# Patient Record
Sex: FEMALE | Race: WHITE | Employment: UNEMPLOYED | URBAN - METROPOLITAN AREA
[De-identification: names, ages, dates, MRNs, and addresses within clinical notes are randomized per-mention and may not be internally consistent; named-entity substitution may affect disease eponyms.]

---

## 2020-09-11 ENCOUNTER — TELEPHONE (OUTPATIENT)
Dept: PAIN MANAGEMENT | Age: 55
End: 2020-09-11

## 2020-09-11 NOTE — TELEPHONE ENCOUNTER
Patient called to see if a referral for her was received in the office via fax. Please contact her at earliest convenience to advise. She'd like to schedule an appointment to be seen. Thank you.

## 2020-10-22 ENCOUNTER — HOSPITAL ENCOUNTER (OUTPATIENT)
Age: 55
Setting detail: SPECIMEN
Discharge: HOME | End: 2020-10-22
Payer: COMMERCIAL

## 2020-10-22 ENCOUNTER — INITIAL CONSULT (OUTPATIENT)
Dept: PAIN MANAGEMENT | Age: 55
End: 2020-10-22
Payer: COMMERCIAL

## 2020-10-22 VITALS
HEIGHT: 64 IN | HEART RATE: 61 BPM | WEIGHT: 140 LBS | SYSTOLIC BLOOD PRESSURE: 122 MMHG | TEMPERATURE: 98.1 F | BODY MASS INDEX: 23.9 KG/M2 | DIASTOLIC BLOOD PRESSURE: 70 MMHG

## 2020-10-22 PROCEDURE — 99203 OFFICE O/P NEW LOW 30 MIN: CPT | Performed by: PAIN MEDICINE

## 2020-10-22 RX ORDER — LISINOPRIL AND HYDROCHLOROTHIAZIDE 20; 12.5 MG/1; MG/1
1 TABLET ORAL
COMMUNITY
End: 2022-06-09

## 2020-10-22 RX ORDER — HYDROXYCHLOROQUINE SULFATE 200 MG/1
TABLET, FILM COATED ORAL
COMMUNITY
Start: 2020-10-12 | End: 2022-04-07 | Stop reason: ALTCHOICE

## 2020-10-22 RX ORDER — DICYCLOMINE HYDROCHLORIDE 10 MG/1
10 CAPSULE ORAL
COMMUNITY
End: 2022-04-07

## 2020-10-22 RX ORDER — METOPROLOL SUCCINATE 25 MG/1
25 TABLET, EXTENDED RELEASE ORAL
COMMUNITY
Start: 2020-03-18

## 2020-10-22 RX ORDER — PRAVASTATIN SODIUM 40 MG
40 TABLET ORAL
COMMUNITY
End: 2022-06-09

## 2020-10-22 RX ORDER — CIPROFLOXACIN AND DEXAMETHASONE 3; 1 MG/ML; MG/ML
SUSPENSION/ DROPS AURICULAR (OTIC) 3 TIMES DAILY PRN
COMMUNITY
Start: 2020-05-15 | End: 2022-04-07 | Stop reason: ALTCHOICE

## 2020-10-22 RX ORDER — PANTOPRAZOLE SODIUM 40 MG/1
TABLET, DELAYED RELEASE ORAL
COMMUNITY
Start: 2019-11-15

## 2020-10-22 RX ORDER — CITALOPRAM 40 MG/1
40 TABLET ORAL
COMMUNITY

## 2020-10-22 RX ORDER — MINERAL OIL/PETROLATUM,WHITE 41.5-56.8%
OINTMENT (GRAM) OPHTHALMIC (EYE)
COMMUNITY
Start: 2020-09-29

## 2020-10-22 RX ORDER — ALBUTEROL SULFATE 90 MCG
HFA AEROSOL WITH ADAPTER (GRAM) INHALATION
COMMUNITY
Start: 2020-10-20

## 2020-10-22 RX ORDER — MELOXICAM 15 MG/1
TABLET ORAL
COMMUNITY
Start: 2019-11-26 | End: 2020-10-22 | Stop reason: ALTCHOICE

## 2020-10-22 RX ORDER — PROMETHAZINE HYDROCHLORIDE 25 MG/1
25 TABLET ORAL
COMMUNITY
Start: 2020-04-03 | End: 2022-06-09

## 2020-10-22 RX ORDER — PREGABALIN 100 MG/1
100 CAPSULE ORAL 2 TIMES DAILY
COMMUNITY
Start: 2020-08-20 | End: 2020-11-23 | Stop reason: ALTCHOICE

## 2020-10-22 RX ORDER — LEFLUNOMIDE 20 MG/1
TABLET ORAL
COMMUNITY
Start: 2020-10-10 | End: 2022-04-07 | Stop reason: ALTCHOICE

## 2020-10-22 RX ORDER — ROPINIROLE 2 MG/1
TABLET, FILM COATED ORAL
COMMUNITY
Start: 2020-03-18

## 2020-10-22 RX ORDER — QUETIAPINE FUMARATE 200 MG/1
400 TABLET, FILM COATED ORAL NIGHTLY
COMMUNITY

## 2020-10-22 RX ORDER — TIZANIDINE 4 MG/1
TABLET ORAL
COMMUNITY
Start: 2020-10-12 | End: 2022-06-09

## 2020-10-22 RX ORDER — FLUTICASONE PROPIONATE 50 MCG
2 SPRAY, SUSPENSION (ML) NASAL DAILY
COMMUNITY
Start: 2019-11-20

## 2020-10-22 RX ORDER — PREDNISONE 1 MG/1
TABLET ORAL
COMMUNITY
Start: 2020-08-04 | End: 2022-06-09

## 2020-10-22 RX ORDER — ARIPIPRAZOLE 10 MG/1
10 TABLET ORAL
COMMUNITY
End: 2022-06-09

## 2020-10-22 RX ORDER — TIZANIDINE 4 MG/1
4 TABLET ORAL 3 TIMES DAILY
COMMUNITY
End: 2020-10-22

## 2020-10-22 RX ORDER — OXYCODONE AND ACETAMINOPHEN 10; 325 MG/1; MG/1
1 TABLET ORAL
COMMUNITY

## 2020-10-22 RX ORDER — FENOFIBRATE 160 MG/1
160 TABLET ORAL
COMMUNITY
End: 2022-06-09

## 2020-10-22 RX ORDER — ONDANSETRON 8 MG/1
TABLET, ORALLY DISINTEGRATING ORAL
COMMUNITY
Start: 2019-12-02

## 2020-10-22 RX ORDER — ISOSORBIDE MONONITRATE 30 MG/1
30 TABLET, EXTENDED RELEASE ORAL NIGHTLY
COMMUNITY
Start: 2019-12-12

## 2020-10-22 RX ORDER — POTASSIUM CHLORIDE 20 MEQ/1
1 TABLET, EXTENDED RELEASE ORAL
COMMUNITY
Start: 2019-12-04

## 2020-10-22 RX ORDER — PRAZOSIN HYDROCHLORIDE 2 MG/1
2 CAPSULE ORAL NIGHTLY
COMMUNITY
Start: 2019-11-15 | End: 2022-06-09

## 2020-10-22 RX ORDER — DICLOFENAC SODIUM 75 MG/1
75 TABLET, DELAYED RELEASE ORAL
COMMUNITY
Start: 2020-05-26 | End: 2022-04-07 | Stop reason: ALTCHOICE

## 2020-10-22 RX ORDER — OXYBUTYNIN CHLORIDE 10 MG/1
10 TABLET, EXTENDED RELEASE ORAL
COMMUNITY

## 2020-10-22 RX ORDER — DIAZEPAM 10 MG/1
10 TABLET ORAL 3 TIMES DAILY PRN
COMMUNITY
End: 2022-04-07 | Stop reason: ALTCHOICE

## 2020-10-22 RX ORDER — MULTIVIT-MIN/IRON/FOLIC ACID/K 18-600-40
CAPSULE ORAL
COMMUNITY
Start: 2020-10-08

## 2020-10-22 ASSESSMENT — ENCOUNTER SYMPTOMS
BOWEL INCONTINENCE: 0
BACK PAIN: 1
CONSTIPATION: 1
CHANGE IN BOWEL HABIT: 0
RESPIRATORY NEGATIVE: 1
NAUSEA: 1
DIARRHEA: 1
FLATUS: 1
EYES NEGATIVE: 1
ABDOMINAL PAIN: 1
PERSISTENT INFECTIONS: 0

## 2020-10-22 NOTE — PROGRESS NOTES
HPI:     Hand Pain    The incident occurred more than 1 week ago. There was no injury mechanism. The pain is present in the right hand and left hand. The quality of the pain is described as aching and cramping. The pain radiates to the right hand and left hand. The pain is at a severity of 8/10. The pain is moderate. The pain has been constant since the incident. Associated symptoms include muscle weakness, numbness and tingling. Pertinent negatives include no chest pain. The symptoms are aggravated by movement. She has tried acetaminophen, elevation and rest for the symptoms. The treatment provided mild relief. Leg Pain    The incident occurred more than 1 week ago. There was no injury mechanism. The pain is present in the left leg, left hip, left knee, left ankle, left heel, left foot, left toes, right leg, right knee, right thigh, right hip, right ankle, right heel, right foot and right toes. The quality of the pain is described as aching and cramping. The pain is at a severity of 8/10. The pain is moderate. The pain has been constant since onset. Associated symptoms include muscle weakness, numbness and tingling. The symptoms are aggravated by movement, palpation and weight bearing. She has tried elevation, acetaminophen, heat and rest for the symptoms. The treatment provided mild relief. Chronic diffuse pain throughout in her neck back arms legs hands and feet. She does have a history of lupus. Also history of fibromyalgia. Takes Lyrica 100 mg twice a day. History of alcohol abuse has been sober for 3 years now. Does use medical marijuana in the state of [place]. Patient denies any new neurological symptoms. No bowel or bladder incontinence, no weakness, and no falling. Review of OARRS does not show any aberrant prescription behavior. Medication is helping the patient stay active. Patient denies any side effects and reports adequate analgesia. No sign of misuse/abuse.     Past Medical History: Diagnosis Date    Anxiety     Fibromyalgia     Gastric ulcer     Lupus ([facility] [address]     Reflux gastritis     Restless leg syndrome     Rheumatoid arthritis (HCC)        Past Surgical History:   Procedure Laterality Date    LARYNX SURGERY      OVARIAN CYST REMOVAL         Allergies   Allergen Reactions    Macrolides And Ketolides      Other reaction(s): Abdominal Pain    Erythromycin Nausea Only    Ketorolac Nausea Only    Nsaids      Gastric ulcers    Pcn [Penicillins] Hives    Sulfa Antibiotics Hives and Itching    Sulfamethoxazole-Trimethoprim     Ciprofloxacin Nausea And Vomiting     Pain, lethargic         Current Outpatient Medications:     pregabalin (LYRICA) 100 MG capsule, Take 100 mg by mouth 2 times daily. , Disp: , Rfl:     diclofenac (VOLTAREN) 75 MG EC tablet, Take 75 mg by mouth, Disp: , Rfl:     isosorbide mononitrate (IMDUR) 30 MG extended release tablet, Take 30 mg by mouth nightly, Disp: , Rfl:     leflunomide (ARAVA) 20 MG tablet, , Disp: , Rfl:     lisinopril-hydroCHLOROthiazide (PRINZIDE;ZESTORETIC) 20-12.5 MG per tablet, Take 1 tablet by mouth, Disp: , Rfl:     metoprolol succinate (TOPROL XL) 25 MG extended release tablet, Take 25 mg by mouth daily (with breakfast), Disp: , Rfl:     ondansetron (ZOFRAN-ODT) 8 MG TBDP disintegrating tablet, , Disp: , Rfl:     pantoprazole (PROTONIX) 40 MG tablet, , Disp: , Rfl:     potassium chloride (KLOR-CON M) 20 MEQ extended release tablet, Take 1 tablet by mouth daily (with breakfast), Disp: , Rfl:     pravastatin (PRAVACHOL) 40 MG tablet, Take 40 mg by mouth, Disp: , Rfl:     prazosin (MINIPRESS) 2 MG capsule, Take 2 mg by mouth nightly, Disp: , Rfl:     predniSONE (DELTASONE) 5 MG tablet, , Disp: , Rfl:     promethazine (PHENERGAN) 25 MG tablet, Take 25 mg by mouth daily (with breakfast), Disp: , Rfl:     QUEtiapine (SEROQUEL) 200 MG tablet, Take 400 mg by mouth nightly, Disp: , Rfl:     rOPINIRole (REQUIP) 2 MG tablet, , Disp: , Rfl:     tiZANidine (ZANAFLEX) 4 MG tablet, , Disp: , Rfl:     verapamil (CALAN SR) 180 MG extended release tablet, Take 180 mg by mouth nightly, Disp: , Rfl:     vitamin E 100 units capsule, Take 100 Units by mouth, Disp: , Rfl:     SENNA-S 8.6-50 MG per tablet, , Disp: , Rfl:     fluticasone (FLONASE) 50 MCG/ACT nasal spray, 2 sprays by Nasal route daily, Disp: , Rfl:     dicyclomine (BENTYL) 10 MG capsule, Take 10 mg by mouth, Disp: , Rfl:     fenofibrate (TRIGLIDE) 160 MG tablet, Take 160 mg by mouth, Disp: , Rfl:     diazePAM (VALIUM) 10 MG tablet, Take 10 mg by mouth 3 times daily as needed. , Disp: , Rfl:     citalopram (CELEXA) 40 MG tablet, Take 40 mg by mouth, Disp: , Rfl:     ciprofloxacin-dexamethasone (CIPRODEX) 0.3-0.1 % otic suspension, 3 times daily as needed, Disp: , Rfl:     D3-1000 25 MCG (1000 UT) TABS tablet, , Disp: , Rfl:     beclomethasone (QVAR REDIHALER) 80 MCG/ACT AERB inhaler, Inhale 160 mcg into the lungs 2 times daily, Disp: , Rfl:     PROVENTIL  (90 Base) MCG/ACT inhaler, , Disp: , Rfl:     esomeprazole (NEXIUM) 20 MG delayed release capsule, Take 20 mg by mouth daily (with breakfast), Disp: , Rfl:     Cyanocobalamin (VITAMIN B 12 PO), Take 50 mcg by mouth, Disp: , Rfl:     omeprazole (PRILOSEC) 40 MG capsule, , Disp: , Rfl:     cyclobenzaprine (FLEXERIL) 10 MG tablet, , Disp: , Rfl:     furosemide (LASIX) 40 MG tablet, , Disp: , Rfl:     oxybutynin (DITROPAN-XL) 10 MG extended release tablet, Take 10 mg by mouth, Disp: , Rfl:     oxyCODONE-acetaminophen (PERCOCET)  MG per tablet, Take 1 tablet by mouth every 4-6 hours as needed. , Disp: , Rfl:     hydroxychloroquine (PLAQUENIL) 200 MG tablet, , Disp: , Rfl:     ARIPiprazole (ABILIFY) 10 MG tablet, Take 10 mg by mouth, Disp: , Rfl:     traZODone (DESYREL) 100 MG tablet, , Disp: , Rfl:     potassium chloride (K-DUR) 10 MEQ tablet, , Disp: , Rfl:     atorvastatin (LIPITOR) 20 MG tablet, , Disp: , Rfl:     gabapentin (NEURONTIN) 300 MG capsule, , Disp: , Rfl:     Family History   Problem Relation Age of Onset    Liver Cancer Maternal Grandfather        Social History     Socioeconomic History    Marital status:      Spouse name: Not on file    Number of children: Not on file    Years of education: Not on file    Highest education level: Not on file   Occupational History    Not on file   Social Needs    Financial resource strain: Not on file    Food insecurity     Worry: Not on file     Inability: Not on file    Transportation needs     Medical: Not on file     Non-medical: Not on file   Tobacco Use    Smoking status: Light Tobacco Smoker    Smokeless tobacco: Never Used   Substance and Sexual Activity    Alcohol use: Not Currently    Drug use: Yes     Frequency: 2.0 times per week     Types: Marijuana     Comment: medical marijuana  twice a week as of 10/22/20    Sexual activity: Not on file   Lifestyle    Physical activity     Days per week: Not on file     Minutes per session: Not on file    Stress: Not on file   Relationships    Social connections     Talks on phone: Not on file     Gets together: Not on file     Attends Catholic service: Not on file     Active member of club or organization: Not on file     Attends meetings of clubs or organizations: Not on file     Relationship status: Not on file    Intimate partner violence     Fear of current or ex partner: Not on file     Emotionally abused: Not on file     Physically abused: Not on file     Forced sexual activity: Not on file   Other Topics Concern    Not on file   Social History Narrative    Not on file       Review of Systems:  Review of Systems   Constitution: Positive for malaise/fatigue. HENT: Positive for hearing loss. Eyes: Negative. Cardiovascular: Negative. Negative for chest pain. Respiratory: Negative. Skin: Negative.     Musculoskeletal: Positive for arthritis, back pain, joint pain, joint swelling, muscle cramps, muscle weakness, myalgias, neck pain and stiffness. Gastrointestinal: Positive for abdominal pain, constipation, diarrhea, dysphagia, flatus and nausea. Negative for change in bowel habit and bowel incontinence. Genitourinary: Negative for bladder incontinence. Neurological: Positive for headaches, numbness, tingling and tremors. Psychiatric/Behavioral: The patient is nervous/anxious. Allergic/Immunologic: Positive for environmental allergies. Negative for persistent infections. Physical Exam:  /70   Pulse 61   Temp 98.1 °F (36.7 °C)   Ht 5' 4\" (1.626 m)   Wt 140 lb (63.5 kg)   BMI 24.03 kg/m²     Physical Exam  Vitals signs reviewed. Constitutional:       General: She is awake. Appearance: She is not ill-appearing or diaphoretic. HENT:      Right Ear: External ear normal.      Left Ear: External ear normal.   Eyes:      General:         Right eye: No discharge. Left eye: No discharge. Conjunctiva/sclera: Conjunctivae normal.   Neck:      Thyroid: No thyromegaly. Trachea: No tracheal deviation. Pulmonary:      Effort: Pulmonary effort is normal. No respiratory distress. Breath sounds: No stridor. Musculoskeletal:      Lumbar back: She exhibits normal range of motion. Comments: Diffuse tenderness to palpation   Skin:     General: Skin is warm and dry. Neurological:      Mental Status: She is alert and oriented to person, place, and time. Psychiatric:         Attention and Perception: Attention and perception normal.         Behavior: Behavior normal.         Record/Diagnostics Review:    As above, I did review the imaging    Orders:  Orders Placed This Encounter   Procedures    DRUG SCREEN, PAIN       Assessment:  1. Fibromyalgia    2. Other chronic pain    3. Marijuana use        Treatment Plan:  DISCUSSION: Treatment options discussed with patient and all questions answered to patient's satisfaction.     OARRS Review:

## 2020-10-26 LAB
6-ACETYLMORPHINE, UR: NOT DETECTED
7-AMINOCLONAZEPAM, URINE: NOT DETECTED
ALPHA-OH-ALPRAZ, URINE: NOT DETECTED
ALPRAZOLAM, URINE: NOT DETECTED
AMPHETAMINES, URINE: NOT DETECTED
BARBITURATES, URINE: NOT DETECTED
BENZOYLECGONINE, UR: NOT DETECTED
BUPRENORPHINE URINE: NOT DETECTED
CARISOPRODOL, UR: NOT DETECTED
CLONAZEPAM, URINE: NOT DETECTED
CODEINE, URINE: NOT DETECTED
CREATININE URINE: 24.8 MG/DL (ref 20–400)
DIAZEPAM, URINE: NOT DETECTED
DRUGS EXPECTED, UR: NORMAL
EER HI RES INTERP UR: NORMAL
ETHYL GLUCURONIDE UR: NOT DETECTED
FENTANYL URINE: NOT DETECTED
HYDROCODONE, URINE: NOT DETECTED
HYDROMORPHONE, URINE: NOT DETECTED
LORAZEPAM, URINE: NOT DETECTED
MARIJUANA METAB, UR: PRESENT
MDA, UR: NOT DETECTED
MDEA, EVE, UR: NOT DETECTED
MDMA URINE: NOT DETECTED
MEPERIDINE METAB, UR: NOT DETECTED
METHADONE, URINE: NOT DETECTED
METHAMPHETAMINE, URINE: NOT DETECTED
METHYLPHENIDATE: NOT DETECTED
MIDAZOLAM, URINE: NOT DETECTED
MORPHINE URINE: NOT DETECTED
NORBUPRENORPHINE, URINE: NOT DETECTED
NORDIAZEPAM, URINE: PRESENT
NORFENTANYL, URINE: NOT DETECTED
NORHYDROCODONE, URINE: NOT DETECTED
NOROXYCODONE, URINE: NOT DETECTED
NOROXYMORPHONE, URINE: NOT DETECTED
OXAZEPAM, URINE: PRESENT
OXYCODONE URINE: NOT DETECTED
OXYMORPHONE, URINE: NOT DETECTED
PAIN MANAGEMENT DRUG PANEL INTERP, URINE: NORMAL
PAIN MGT DRUG PANEL, HI RES, UR: NORMAL
PCP,URINE: NOT DETECTED
PHENTERMINE, UR: NOT DETECTED
PROPOXYPHENE, URINE: NOT DETECTED
TAPENTADOL, URINE: NOT DETECTED
TAPENTADOL-O-SULFATE, URINE: NOT DETECTED
TEMAZEPAM, URINE: PRESENT
TRAMADOL, URINE: NOT DETECTED
ZOLPIDEM, URINE: NOT DETECTED

## 2020-11-06 ENCOUNTER — TELEPHONE (OUTPATIENT)
Dept: PAIN MANAGEMENT | Age: 55
End: 2020-11-06

## 2020-11-06 NOTE — TELEPHONE ENCOUNTER
Patient called regarding her Lyrica prescription. She states Dr Aaliyah Amador was increasing her medication from 200 to 300 per day and the pharmacy does not have a refill for her. She states she is now out. She states she was to receive a return phone call yesterday. Please contact her as soon as possible to discuss. Phone number on file has been verified. Thank you.

## 2020-11-10 ENCOUNTER — TELEPHONE (OUTPATIENT)
Dept: PAIN MANAGEMENT | Age: 55
End: 2020-11-10

## 2020-11-10 NOTE — TELEPHONE ENCOUNTER
Patient states that Dr. Abundio Hernández increased her Lyrica to 100mg TID, and she has now run out.  She is requesting a refill to be sent to her pharmacy asap

## 2020-11-23 ENCOUNTER — VIRTUAL VISIT (OUTPATIENT)
Dept: PAIN MANAGEMENT | Age: 55
End: 2020-11-23
Payer: COMMERCIAL

## 2020-11-23 PROBLEM — G89.29 OTHER CHRONIC PAIN: Status: ACTIVE | Noted: 2020-11-23

## 2020-11-23 PROBLEM — M79.7 FIBROMYALGIA: Status: ACTIVE | Noted: 2020-11-23

## 2020-11-23 PROCEDURE — 99214 OFFICE O/P EST MOD 30 MIN: CPT | Performed by: NURSE PRACTITIONER

## 2020-11-23 RX ORDER — PREGABALIN 100 MG/1
100 CAPSULE ORAL 3 TIMES DAILY
Qty: 90 CAPSULE | Refills: 0 | Status: SHIPPED | OUTPATIENT
Start: 2020-11-30 | End: 2021-01-11 | Stop reason: SDUPTHER

## 2020-11-23 ASSESSMENT — ENCOUNTER SYMPTOMS
COUGH: 0
SHORTNESS OF BREATH: 0
CONSTIPATION: 0
BACK PAIN: 1

## 2020-11-23 NOTE — PROGRESS NOTES
Patient completed a video visit today    Chief Complaint: multiple pain complaints    [facility] Patient complains of diffuse pain throughout her body. She does have a history of Lupus and Fibromyalgia. She has been taking Lyrica 100 mg BID with mild relief. At recent visit with Dr. Indira Hu, taking 100 mg TID was discussed. Patient started to take Lyrica 100 mg TID after recent visit and reports mild relief. Patient has a history of ETOH abuse. She uses medical marijuana for pain relief. Pt did get a prescription for #60 Lyrica on 11/10 from a provider in [place]. Did discuss with patient that she should only be taking Lyrica prescribed by pain management at this time. She will be due to fill 11/30 to continue TID dose. Hand Pain    The incident occurred more than 1 week ago. There was no injury mechanism. The pain is present in the left hand and right hand. Quality: tingling, numbness. The pain is at a severity of 6/10. The pain is moderate. The pain has been constant since the incident. Associated symptoms include numbness and tingling. Pertinent negatives include no chest pain. She has tried rest for the symptoms. The treatment provided mild relief. Neck Pain    This is a chronic problem. The current episode started more than 1 year ago. The problem occurs constantly. The problem has been unchanged. The pain is present in the left side, midline and right side. The quality of the pain is described as aching. The pain is at a severity of 9/10. The pain is moderate. The symptoms are aggravated by position and twisting. Associated symptoms include headaches, numbness and tingling. Pertinent negatives include no chest pain or fever. She has tried bed rest and heat for the symptoms. The treatment provided mild relief. Back Pain   This is a chronic problem. The current episode started more than 1 year ago. The problem occurs constantly. The problem is unchanged. The pain is present in the lumbar spine.  The quality of the pain is described as aching. Radiates to: down both legs to the feet. The pain is at a severity of 7/10. The pain is moderate. Associated symptoms include headaches, numbness and tingling. Pertinent negatives include no chest pain or fever. She has tried analgesics and heat for the symptoms. The treatment provided mild relief. Patient denies any new neurological symptoms. No bowel or bladder incontinence, no weakness, and no falling. Controlled Substance Monitoring:    Acute and Chronic Pain Monitoring:   RX Monitoring 11/23/2020   Periodic Controlled Substance Monitoring No signs of potential drug abuse or diversion identified. Past Medical History:   Diagnosis Date    Anxiety     Fibromyalgia     Gastric ulcer     Lupus ([facility] [address]     Reflux gastritis     Restless leg syndrome     Rheumatoid arthritis (HCC)        Past Surgical History:   Procedure Laterality Date    LARYNX SURGERY      OVARIAN CYST REMOVAL         Allergies   Allergen Reactions    Macrolides And Ketolides      Other reaction(s): Abdominal Pain    Erythromycin Nausea Only    Ketorolac Nausea Only    Nsaids      Gastric ulcers    Pcn [Penicillins] Hives    Sulfa Antibiotics Hives and Itching    Sulfamethoxazole-Trimethoprim     Ciprofloxacin Nausea And Vomiting     Pain, lethargic         Current Outpatient Medications:     pregabalin (LYRICA) 100 MG capsule, Take 100 mg by mouth 2 times daily. , Disp: , Rfl:     diclofenac (VOLTAREN) 75 MG EC tablet, Take 75 mg by mouth, Disp: , Rfl:     isosorbide mononitrate (IMDUR) 30 MG extended release tablet, Take 30 mg by mouth nightly, Disp: , Rfl:     leflunomide (ARAVA) 20 MG tablet, , Disp: , Rfl:     lisinopril-hydroCHLOROthiazide (PRINZIDE;ZESTORETIC) 20-12.5 MG per tablet, Take 1 tablet by mouth, Disp: , Rfl:     metoprolol succinate (TOPROL XL) 25 MG extended release tablet, Take 25 mg by mouth daily (with breakfast), Disp: , Rfl:     ondansetron (ZOFRAN-ODT) 8 MG TBDP disintegrating tablet, , Disp: , Rfl:     oxybutynin (DITROPAN-XL) 10 MG extended release tablet, Take 10 mg by mouth, Disp: , Rfl:     oxyCODONE-acetaminophen (PERCOCET)  MG per tablet, Take 1 tablet by mouth every 4-6 hours as needed. , Disp: , Rfl:     pantoprazole (PROTONIX) 40 MG tablet, , Disp: , Rfl:     potassium chloride (KLOR-CON M) 20 MEQ extended release tablet, Take 1 tablet by mouth daily (with breakfast), Disp: , Rfl:     pravastatin (PRAVACHOL) 40 MG tablet, Take 40 mg by mouth, Disp: , Rfl:     prazosin (MINIPRESS) 2 MG capsule, Take 2 mg by mouth nightly, Disp: , Rfl:     predniSONE (DELTASONE) 5 MG tablet, , Disp: , Rfl:     promethazine (PHENERGAN) 25 MG tablet, Take 25 mg by mouth daily (with breakfast), Disp: , Rfl:     QUEtiapine (SEROQUEL) 200 MG tablet, Take 400 mg by mouth nightly, Disp: , Rfl:     rOPINIRole (REQUIP) 2 MG tablet, , Disp: , Rfl:     tiZANidine (ZANAFLEX) 4 MG tablet, , Disp: , Rfl:     verapamil (CALAN SR) 180 MG extended release tablet, Take 180 mg by mouth nightly, Disp: , Rfl:     vitamin E 100 units capsule, Take 100 Units by mouth, Disp: , Rfl:     SENNA-S 8.6-50 MG per tablet, , Disp: , Rfl:     hydroxychloroquine (PLAQUENIL) 200 MG tablet, , Disp: , Rfl:     fluticasone (FLONASE) 50 MCG/ACT nasal spray, 2 sprays by Nasal route daily, Disp: , Rfl:     dicyclomine (BENTYL) 10 MG capsule, Take 10 mg by mouth, Disp: , Rfl:     fenofibrate (TRIGLIDE) 160 MG tablet, Take 160 mg by mouth, Disp: , Rfl:     diazePAM (VALIUM) 10 MG tablet, Take 10 mg by mouth 3 times daily as needed. , Disp: , Rfl:     citalopram (CELEXA) 40 MG tablet, Take 40 mg by mouth, Disp: , Rfl:     ciprofloxacin-dexamethasone (CIPRODEX) 0.3-0.1 % otic suspension, 3 times daily as needed, Disp: , Rfl:     D3-1000 25 MCG (1000 UT) TABS tablet, , Disp: , Rfl:     beclomethasone (QVAR REDIHALER) 80 MCG/ACT AERB inhaler, Inhale 160 mcg into the lungs 2 times daily, Disp: , Rfl:     ARIPiprazole (ABILIFY) 10 MG tablet, Take 10 mg by mouth, Disp: , Rfl:     PROVENTIL  (90 Base) MCG/ACT inhaler, , Disp: , Rfl:     esomeprazole (NEXIUM) 20 MG delayed release capsule, Take 20 mg by mouth daily (with breakfast), Disp: , Rfl:     Cyanocobalamin (VITAMIN B 12 PO), Take 50 mcg by mouth, Disp: , Rfl:     traZODone (DESYREL) 100 MG tablet, , Disp: , Rfl:     potassium chloride (K-DUR) 10 MEQ tablet, , Disp: , Rfl:     omeprazole (PRILOSEC) 40 MG capsule, , Disp: , Rfl:     cyclobenzaprine (FLEXERIL) 10 MG tablet, , Disp: , Rfl:     furosemide (LASIX) 40 MG tablet, , Disp: , Rfl:     atorvastatin (LIPITOR) 20 MG tablet, , Disp: , Rfl:     gabapentin (NEURONTIN) 300 MG capsule, , Disp: , Rfl:     Family History   Problem Relation Age of Onset    Liver Cancer Maternal Grandfather        Social History     Socioeconomic History    Marital status:      Spouse name: Not on file    Number of children: Not on file    Years of education: Not on file    Highest education level: Not on file   Occupational History    Not on file   Social Needs    Financial resource strain: Not on file    Food insecurity     Worry: Not on file     Inability: Not on file    Transportation needs     Medical: Not on file     Non-medical: Not on file   Tobacco Use    Smoking status: Light Tobacco Smoker    Smokeless tobacco: Never Used   Substance and Sexual Activity    Alcohol use: Not Currently    Drug use: Yes     Frequency: 2.0 times per week     Types: Marijuana     Comment: medical marijuana  twice a week as of 10/22/20    Sexual activity: Not on file   Lifestyle    Physical activity     Days per week: Not on file     Minutes per session: Not on file    Stress: Not on file   Relationships    Social connections     Talks on phone: Not on file     Gets together: Not on file     Attends Bahai service: Not on file     Active member of club or organization: Not on was present when appropriate. Due to this being a TeleHealth encounter (During UYMYD-58 public health emergency), evaluation of the following organ systems was limited: Vitals/Constitutional/EENT/Resp/CV/GI//MS/Neuro/Skin/Heme-Lymph-Imm. Pursuant to the emergency declaration under the [address] [place] [address], [address] [place] [address] and the [employer] Act, this Virtual Visit was conducted with patient's (and/or legal guardian's) consent, to reduce the patient's risk of exposure to COVID-19 and provide necessary medical care. The patient (and/or legal guardian) has also been advised to contact this office for worsening conditions or problems, and seek emergency medical treatment and/or call 911 if deemed necessary. Patient identification was verified at the start of the visit: Yes    Total time spent for this encounter: Not billed by time    Services were provided through a video synchronous discussion virtually to substitute for in-person clinic visit. Patient and provider were located at their individual homes. --DHEERAJ Wallace CNP on 11/23/2020 at 12:32 PM    An electronic signature was used to authenticate this note.

## 2021-01-11 ENCOUNTER — TELEPHONE (OUTPATIENT)
Dept: PAIN MANAGEMENT | Age: 56
End: 2021-01-11

## 2021-01-11 DIAGNOSIS — G89.29 OTHER CHRONIC PAIN: ICD-10-CM

## 2021-01-11 DIAGNOSIS — M79.7 FIBROMYALGIA: ICD-10-CM

## 2021-01-11 RX ORDER — PREGABALIN 100 MG/1
100 CAPSULE ORAL 3 TIMES DAILY
Qty: 90 CAPSULE | Refills: 0 | Status: SHIPPED | OUTPATIENT
Start: 2021-01-11 | End: 2021-01-18 | Stop reason: SDUPTHER

## 2021-01-18 ENCOUNTER — VIRTUAL VISIT (OUTPATIENT)
Dept: PAIN MANAGEMENT | Age: 56
End: 2021-01-18
Payer: COMMERCIAL

## 2021-01-18 DIAGNOSIS — M79.7 FIBROMYALGIA: ICD-10-CM

## 2021-01-18 DIAGNOSIS — G89.29 OTHER CHRONIC PAIN: ICD-10-CM

## 2021-01-18 PROCEDURE — 99212 OFFICE O/P EST SF 10 MIN: CPT | Performed by: NURSE PRACTITIONER

## 2021-01-18 RX ORDER — PREGABALIN 100 MG/1
100 CAPSULE ORAL 3 TIMES DAILY
Qty: 90 CAPSULE | Refills: 0 | Status: SHIPPED | OUTPATIENT
Start: 2021-02-10 | End: 2021-03-08 | Stop reason: SDUPTHER

## 2021-01-18 ASSESSMENT — ENCOUNTER SYMPTOMS
BOWEL INCONTINENCE: 0
RESPIRATORY NEGATIVE: 1
BACK PAIN: 1

## 2021-01-18 NOTE — PROGRESS NOTES
Patient completed a video visit today to review medication contract. Chief Complaint: multiple pain complaints    [facility]  Patient complains of diffuse pain throughout her body. She does have a history of Lupus and Fibromyalgia. She has been taking Lyrica 100 mg BID with mild relief. At recent visit with Dr. Oscar Sue, taking 100 mg TID was discussed. Patient started to take Lyrica 100 mg TID after recent visit and reports mild relief. Patient has a history of ETOH abuse. She uses medical marijuana for pain relief. Pt did get a prescription for #60 Lyrica on 11/10 from a provider in [place]. Did discuss with patient that she should only be taking Lyrica prescribed by pain management at this time. She states she has had PT in the past but this made her pain worse. Back Pain  This is a chronic problem. The current episode started more than 1 year ago. The problem occurs constantly. The problem is unchanged. The pain is present in the lumbar spine. The quality of the pain is described as aching, burning, cramping, shooting and stabbing. The pain radiates to the left foot, left thigh, left knee, right foot, right knee and right thigh. The pain is at a severity of 6/10. The pain is moderate. The pain is worse during the day. The symptoms are aggravated by standing and bending. Stiffness is present in the morning. Associated symptoms include numbness, tingling and weakness. Pertinent negatives include no bladder incontinence, bowel incontinence or chest pain. The treatment provided mild relief. Patient denies any new neurological symptoms. No bowel or bladder incontinence, no weakness, and no falling. Pill count: appropriate due 2/10    Controlled Substance Monitoring:    Acute and Chronic Pain Monitoring:   RX Monitoring 1/18/2021   Periodic Controlled Substance Monitoring No signs of potential drug abuse or diversion identified.            Past Medical History:   Diagnosis Date    Anxiety  Fibromyalgia     Gastric ulcer     Lupus (HCC)     Reflux gastritis     Restless leg syndrome     Rheumatoid arthritis (HCC)        Past Surgical History:   Procedure Laterality Date    LARYNX SURGERY      OVARIAN CYST REMOVAL         Allergies   Allergen Reactions    Macrolides And Ketolides      Other reaction(s): Abdominal Pain    Erythromycin Nausea Only    Ketorolac Nausea Only    Nsaids      Gastric ulcers    Pcn [Penicillins] Hives    Sulfa Antibiotics Hives and Itching    Sulfamethoxazole-Trimethoprim     Ciprofloxacin Nausea And Vomiting     Pain, lethargic         Current Outpatient Medications:     pregabalin (LYRICA) 100 MG capsule, Take 1 capsule by mouth 3 times daily for 30 days. , Disp: 90 capsule, Rfl: 0    diclofenac (VOLTAREN) 75 MG EC tablet, Take 75 mg by mouth, Disp: , Rfl:     isosorbide mononitrate (IMDUR) 30 MG extended release tablet, Take 30 mg by mouth nightly, Disp: , Rfl:     leflunomide (ARAVA) 20 MG tablet, , Disp: , Rfl:     lisinopril-hydroCHLOROthiazide (PRINZIDE;ZESTORETIC) 20-12.5 MG per tablet, Take 1 tablet by mouth, Disp: , Rfl:     metoprolol succinate (TOPROL XL) 25 MG extended release tablet, Take 25 mg by mouth daily (with breakfast), Disp: , Rfl:     ondansetron (ZOFRAN-ODT) 8 MG TBDP disintegrating tablet, , Disp: , Rfl:     oxybutynin (DITROPAN-XL) 10 MG extended release tablet, Take 10 mg by mouth, Disp: , Rfl:     oxyCODONE-acetaminophen (PERCOCET)  MG per tablet, Take 1 tablet by mouth every 4-6 hours as needed. , Disp: , Rfl:     pantoprazole (PROTONIX) 40 MG tablet, , Disp: , Rfl:     potassium chloride (KLOR-CON M) 20 MEQ extended release tablet, Take 1 tablet by mouth daily (with breakfast), Disp: , Rfl:     pravastatin (PRAVACHOL) 40 MG tablet, Take 40 mg by mouth, Disp: , Rfl:     prazosin (MINIPRESS) 2 MG capsule, Take 2 mg by mouth nightly, Disp: , Rfl:     predniSONE (DELTASONE) 5 MG tablet, , Disp: , Rfl:   promethazine (PHENERGAN) 25 MG tablet, Take 25 mg by mouth daily (with breakfast), Disp: , Rfl:     QUEtiapine (SEROQUEL) 200 MG tablet, Take 400 mg by mouth nightly, Disp: , Rfl:     rOPINIRole (REQUIP) 2 MG tablet, , Disp: , Rfl:     tiZANidine (ZANAFLEX) 4 MG tablet, , Disp: , Rfl:     verapamil (CALAN SR) 180 MG extended release tablet, Take 180 mg by mouth nightly, Disp: , Rfl:     vitamin E 100 units capsule, Take 100 Units by mouth, Disp: , Rfl:     SENNA-S 8.6-50 MG per tablet, , Disp: , Rfl:     hydroxychloroquine (PLAQUENIL) 200 MG tablet, , Disp: , Rfl:     fluticasone (FLONASE) 50 MCG/ACT nasal spray, 2 sprays by Nasal route daily, Disp: , Rfl:     dicyclomine (BENTYL) 10 MG capsule, Take 10 mg by mouth, Disp: , Rfl:     fenofibrate (TRIGLIDE) 160 MG tablet, Take 160 mg by mouth, Disp: , Rfl:     diazePAM (VALIUM) 10 MG tablet, Take 10 mg by mouth 3 times daily as needed. , Disp: , Rfl:     citalopram (CELEXA) 40 MG tablet, Take 40 mg by mouth, Disp: , Rfl:     ciprofloxacin-dexamethasone (CIPRODEX) 0.3-0.1 % otic suspension, 3 times daily as needed, Disp: , Rfl:     D3-1000 25 MCG (1000 UT) TABS tablet, , Disp: , Rfl:     beclomethasone (QVAR REDIHALER) 80 MCG/ACT AERB inhaler, Inhale 160 mcg into the lungs 2 times daily, Disp: , Rfl:     ARIPiprazole (ABILIFY) 10 MG tablet, Take 10 mg by mouth, Disp: , Rfl:     PROVENTIL  (90 Base) MCG/ACT inhaler, , Disp: , Rfl:     esomeprazole (NEXIUM) 20 MG delayed release capsule, Take 20 mg by mouth daily (with breakfast), Disp: , Rfl:     Cyanocobalamin (VITAMIN B 12 PO), Take 50 mcg by mouth, Disp: , Rfl:     traZODone (DESYREL) 100 MG tablet, , Disp: , Rfl:     potassium chloride (K-DUR) 10 MEQ tablet, , Disp: , Rfl:     omeprazole (PRILOSEC) 40 MG capsule, , Disp: , Rfl:     cyclobenzaprine (FLEXERIL) 10 MG tablet, , Disp: , Rfl:     furosemide (LASIX) 40 MG tablet, , Disp: , Rfl:   atorvastatin (LIPITOR) 20 MG tablet, , Disp: , Rfl:     Family History   Problem Relation Age of Onset    Liver Cancer Maternal Grandfather        Social History     Socioeconomic History    Marital status:      Spouse name: Not on file    Number of children: Not on file    Years of education: Not on file    Highest education level: Not on file   Occupational History    Not on file   Social Needs    Financial resource strain: Not on file    Food insecurity     Worry: Not on file     Inability: Not on file    Transportation needs     Medical: Not on file     Non-medical: Not on file   Tobacco Use    Smoking status: Light Tobacco Smoker    Smokeless tobacco: Never Used   Substance and Sexual Activity    Alcohol use: Not Currently    Drug use: Yes     Frequency: 2.0 times per week     Types: Marijuana     Comment: medical marijuana  twice a week as of 10/22/20    Sexual activity: Not on file   Lifestyle    Physical activity     Days per week: Not on file     Minutes per session: Not on file    Stress: Not on file   Relationships    Social connections     Talks on phone: Not on file     Gets together: Not on file     Attends Voodoo service: Not on file     Active member of club or organization: Not on file     Attends meetings of clubs or organizations: Not on file     Relationship status: Not on file    Intimate partner violence     Fear of current or ex partner: Not on file     Emotionally abused: Not on file     Physically abused: Not on file     Forced sexual activity: Not on file   Other Topics Concern    Not on file   Social History Narrative    Not on file       Review of Systems:  Review of Systems   Constitution: Negative. Cardiovascular: Negative for chest pain and palpitations. Respiratory: Negative. Musculoskeletal: Positive for back pain and myalgias. Gastrointestinal: Negative for bowel incontinence. Genitourinary: Negative for bladder incontinence. Neurological: Positive for numbness, tingling and weakness. Physical Exam:  There were no vitals taken for this visit. Physical Exam  HENT:      Head: Normocephalic. Pulmonary:      Effort: Pulmonary effort is normal.   Neurological:      Mental Status: She is alert. Psychiatric:         Mood and Affect: Mood normal.         Behavior: Behavior normal.         Record/Diagnostics Review:    Last wong 10/2020  and was +THC    Assessment:  Problem List Items Addressed This Visit     Fibromyalgia    Relevant Medications    pregabalin (LYRICA) 100 MG capsule (Start on 2/10/2021)    Other chronic pain    Relevant Medications    pregabalin (LYRICA) 100 MG capsule (Start on 2/10/2021)             Treatment Plan:  Discussed different treatment options including continued conservative care such as physical therapy, chiropractic care, acupuncture. She states PT makes her pain worse  Discussed different interventional options such as epidural steroids or medial branch blocks.   Continue Lyrica 100 mg TID  Follow up in 8 weeks Elizabeth Caal is a 50 y.o. female being evaluated by a Virtual Visit (video visit) encounter to address concerns as mentioned above. A caregiver was present when appropriate. Due to this being a TeleHealth encounter (During GVJ-29 public health emergency), evaluation of the following organ systems was limited: Vitals/Constitutional/EENT/Resp/CV/GI//MS/Neuro/Skin/Heme-Lymph-Imm. Pursuant to the emergency declaration under the [address] [place] [address], [address] [place] [address] and the [employer] Act, this Virtual Visit was conducted with patient's (and/or legal guardian's) consent, to reduce the patient's risk of exposure to COVID-19 and provide necessary medical care. The patient (and/or legal guardian) has also been advised to contact this office for worsening conditions or problems, and seek emergency medical treatment and/or call 911 if deemed necessary. Patient identification was verified at the start of the visit: Yes    Total time spent for this encounter: Not billed by time    Services were provided through a video synchronous discussion virtually to substitute for in-person clinic visit. Patient and provider were located at their individual homes. --DHEERAJ Uribe CNP on 1/18/2021 at 4:34 PM    An electronic signature was used to authenticate this note.

## 2021-03-08 ENCOUNTER — VIRTUAL VISIT (OUTPATIENT)
Dept: PAIN MANAGEMENT | Age: 56
End: 2021-03-08
Payer: COMMERCIAL

## 2021-03-08 DIAGNOSIS — M79.7 FIBROMYALGIA: ICD-10-CM

## 2021-03-08 DIAGNOSIS — G89.29 OTHER CHRONIC PAIN: ICD-10-CM

## 2021-03-08 PROCEDURE — 99212 OFFICE O/P EST SF 10 MIN: CPT | Performed by: NURSE PRACTITIONER

## 2021-03-08 RX ORDER — PREGABALIN 100 MG/1
100 CAPSULE ORAL 3 TIMES DAILY
Qty: 90 CAPSULE | Refills: 1 | Status: SHIPPED | OUTPATIENT
Start: 2021-03-12 | End: 2021-04-06 | Stop reason: SDUPTHER

## 2021-03-08 ASSESSMENT — ENCOUNTER SYMPTOMS
COUGH: 0
SHORTNESS OF BREATH: 0
CONSTIPATION: 0
BACK PAIN: 1

## 2021-03-08 NOTE — PROGRESS NOTES
Patient completed a video visit today to review medication contract. Chief Complaint: multiple pain complaints    [facility]   Patient complains of diffuse pain throughout her body. She does have a history of Lupus and Fibromyalgia. She has been taking Lyrica 100 mg BID with mild relief. At recent visit with Dr. Sarah Rosario, taking 100 mg TID was discussed. Patient started to take Lyrica 100 mg TID and reports mild relief. Patient has a history of ETOH abuse.  She uses medical marijuana for pain relief.  Pt did get a prescription for #60 Lyrica on 11/10 from a provider in [place]. Did discuss with patient that she should only be taking Lyrica prescribed by pain management at this time. She states she has had PT in the past but this made her pain worse. Back Pain  This is a chronic problem. The current episode started more than 1 year ago. The problem occurs constantly. The problem has been gradually worsening since onset. The pain is present in the lumbar spine. The quality of the pain is described as aching. The pain does not radiate. The pain is at a severity of 7/10. The pain is moderate. The pain is worse during the day. The symptoms are aggravated by lying down, bending and standing. Associated symptoms include leg pain, numbness and tingling. Pertinent negatives include no chest pain or fever. She has tried bed rest, heat and analgesics for the symptoms. The treatment provided mild relief. Patient denies any new neurological symptoms. No bowel or bladder incontinence, no weakness, and no falling.     Pill count: appropriate due 3/12    Past Medical History:   Diagnosis Date    Anxiety     Fibromyalgia     Gastric ulcer     Lupus ([address]     Reflux gastritis     Restless leg syndrome     Rheumatoid arthritis (HCC)        Past Surgical History:   Procedure Laterality Date    LARYNX SURGERY      OVARIAN CYST REMOVAL         Allergies   Allergen Reactions    Macrolides And Ketolides      Other reaction(s): Abdominal Pain    Erythromycin Nausea Only    Ketorolac Nausea Only    Nsaids      Gastric ulcers    Pcn [Penicillins] Hives    Sulfa Antibiotics Hives and Itching    Sulfamethoxazole-Trimethoprim     Ciprofloxacin Nausea And Vomiting     Pain, lethargic         Current Outpatient Medications:     pregabalin (LYRICA) 100 MG capsule, Take 1 capsule by mouth 3 times daily for 30 days. , Disp: 90 capsule, Rfl: 0    diclofenac (VOLTAREN) 75 MG EC tablet, Take 75 mg by mouth, Disp: , Rfl:     isosorbide mononitrate (IMDUR) 30 MG extended release tablet, Take 30 mg by mouth nightly, Disp: , Rfl:     leflunomide (ARAVA) 20 MG tablet, , Disp: , Rfl:     lisinopril-hydroCHLOROthiazide (PRINZIDE;ZESTORETIC) 20-12.5 MG per tablet, Take 1 tablet by mouth, Disp: , Rfl:     metoprolol succinate (TOPROL XL) 25 MG extended release tablet, Take 25 mg by mouth daily (with breakfast), Disp: , Rfl:     ondansetron (ZOFRAN-ODT) 8 MG TBDP disintegrating tablet, , Disp: , Rfl:     oxybutynin (DITROPAN-XL) 10 MG extended release tablet, Take 10 mg by mouth, Disp: , Rfl:     oxyCODONE-acetaminophen (PERCOCET)  MG per tablet, Take 1 tablet by mouth every 4-6 hours as needed. , Disp: , Rfl:     pantoprazole (PROTONIX) 40 MG tablet, , Disp: , Rfl:     potassium chloride (KLOR-CON M) 20 MEQ extended release tablet, Take 1 tablet by mouth daily (with breakfast), Disp: , Rfl:     pravastatin (PRAVACHOL) 40 MG tablet, Take 40 mg by mouth, Disp: , Rfl:     prazosin (MINIPRESS) 2 MG capsule, Take 2 mg by mouth nightly, Disp: , Rfl:     predniSONE (DELTASONE) 5 MG tablet, , Disp: , Rfl:     promethazine (PHENERGAN) 25 MG tablet, Take 25 mg by mouth daily (with breakfast), Disp: , Rfl:     QUEtiapine (SEROQUEL) 200 MG tablet, Take 400 mg by mouth nightly, Disp: , Rfl:     rOPINIRole (REQUIP) 2 MG tablet, , Disp: , Rfl:     tiZANidine (ZANAFLEX) 4 MG tablet, , Disp: , Rfl:     verapamil (CALAN SR) 180 MG extended release tablet, Take 180 mg by mouth nightly, Disp: , Rfl:     vitamin E 100 units capsule, Take 100 Units by mouth, Disp: , Rfl:     SENNA-S 8.6-50 MG per tablet, , Disp: , Rfl:     hydroxychloroquine (PLAQUENIL) 200 MG tablet, , Disp: , Rfl:     fluticasone (FLONASE) 50 MCG/ACT nasal spray, 2 sprays by Nasal route daily, Disp: , Rfl:     dicyclomine (BENTYL) 10 MG capsule, Take 10 mg by mouth, Disp: , Rfl:     fenofibrate (TRIGLIDE) 160 MG tablet, Take 160 mg by mouth, Disp: , Rfl:     diazePAM (VALIUM) 10 MG tablet, Take 10 mg by mouth 3 times daily as needed. , Disp: , Rfl:     citalopram (CELEXA) 40 MG tablet, Take 40 mg by mouth, Disp: , Rfl:     ciprofloxacin-dexamethasone (CIPRODEX) 0.3-0.1 % otic suspension, 3 times daily as needed, Disp: , Rfl:     D3-1000 25 MCG (1000 UT) TABS tablet, , Disp: , Rfl:     beclomethasone (QVAR REDIHALER) 80 MCG/ACT AERB inhaler, Inhale 160 mcg into the lungs 2 times daily, Disp: , Rfl:     ARIPiprazole (ABILIFY) 10 MG tablet, Take 10 mg by mouth, Disp: , Rfl:     PROVENTIL  (90 Base) MCG/ACT inhaler, , Disp: , Rfl:     esomeprazole (NEXIUM) 20 MG delayed release capsule, Take 20 mg by mouth daily (with breakfast), Disp: , Rfl:     Cyanocobalamin (VITAMIN B 12 PO), Take 50 mcg by mouth, Disp: , Rfl:     traZODone (DESYREL) 100 MG tablet, , Disp: , Rfl:     potassium chloride (K-DUR) 10 MEQ tablet, , Disp: , Rfl:     omeprazole (PRILOSEC) 40 MG capsule, , Disp: , Rfl:     cyclobenzaprine (FLEXERIL) 10 MG tablet, , Disp: , Rfl:     furosemide (LASIX) 40 MG tablet, , Disp: , Rfl:     atorvastatin (LIPITOR) 20 MG tablet, , Disp: , Rfl:     Family History   Problem Relation Age of Onset    Liver Cancer Maternal Grandfather        Social History     Socioeconomic History    Marital status:      Spouse name: Not on file    Number of children: Not on file    Years of education: Not on file    Highest education level: Not on file Occupational History    Not on file   Social Needs    Financial resource strain: Not on file    Food insecurity     Worry: Not on file     Inability: Not on file    Transportation needs     Medical: Not on file     Non-medical: Not on file   Tobacco Use    Smoking status: Light Tobacco Smoker    Smokeless tobacco: Never Used   Substance and Sexual Activity    Alcohol use: Not Currently    Drug use: Yes     Frequency: 2.0 times per week     Types: Marijuana     Comment: medical marijuana  twice a week as of 10/22/20    Sexual activity: Not on file   Lifestyle    Physical activity     Days per week: Not on file     Minutes per session: Not on file    Stress: Not on file   Relationships    Social connections     Talks on phone: Not on file     Gets together: Not on file     Attends [employer] service: Not on file     Active member of club or organization: Not on file     Attends meetings of clubs or organizations: Not on file     Relationship status: Not on file    Intimate partner violence     Fear of current or ex partner: Not on file     Emotionally abused: Not on file     Physically abused: Not on file     Forced sexual activity: Not on file   Other Topics Concern    Not on file   Social History Narrative    Not on file       Review of Systems:  Review of Systems   Constitution: Negative for chills and fever. Cardiovascular: Negative for chest pain and palpitations. Respiratory: Negative for cough and shortness of breath. Musculoskeletal: Positive for back pain. Gastrointestinal: Negative for constipation. Neurological: Positive for numbness and tingling. Negative for disturbances in coordination and loss of balance. Physical Exam:  There were no vitals taken for this visit. Physical Exam  HENT:      Head: Normocephalic. Pulmonary:      Effort: Pulmonary effort is normal.   Neurological:      Mental Status: She is alert.    Psychiatric:         Mood and Affect: Mood normal. Behavior: Behavior normal.         Record/Diagnostics Review:    Last wong 10/2020 and was +THC    Assessment:  Problem List Items Addressed This Visit     Fibromyalgia    Relevant Medications    pregabalin (LYRICA) 100 MG capsule (Start on 3/12/2021)    Other chronic pain    Relevant Medications    pregabalin (LYRICA) 100 MG capsule (Start on 3/12/2021)             Treatment Plan:  Discussed different treatment options including continued conservative care such as physical therapy, chiropractic care, acupuncture. Discussed different interventional options such as epidural steroids or medial branch blocks. Continue Lyrica 100 mg TID  Follow up 8 weeks    Janette Marcano, was evaluated through a synchronous (real-time) audio-video encounter. The patient (or guardian if applicable) is aware that this is a billable service. Verbal consent to proceed has been obtained within the past 12 months. The visit was conducted pursuant to the emergency declaration under the [address] [place] [address], [address] [place] [address] authority and the [employer] Act. Patient identification was verified, and a caregiver was present when appropriate. The patient was located in a state where the provider was credentialed to provide care. Total time spent for this encounter: Not billed by time    --DHEERAJ Andino CNP on 3/8/2021 at 11:42 AM    An electronic signature was used to authenticate this note.

## 2021-04-06 ENCOUNTER — OFFICE VISIT (OUTPATIENT)
Dept: PAIN MANAGEMENT | Age: 56
End: 2021-04-06
Payer: COMMERCIAL

## 2021-04-06 VITALS — BODY MASS INDEX: 26.46 KG/M2 | WEIGHT: 155 LBS | HEIGHT: 64 IN

## 2021-04-06 DIAGNOSIS — G89.29 OTHER CHRONIC PAIN: ICD-10-CM

## 2021-04-06 DIAGNOSIS — M79.7 FIBROMYALGIA: ICD-10-CM

## 2021-04-06 PROCEDURE — 99213 OFFICE O/P EST LOW 20 MIN: CPT | Performed by: NURSE PRACTITIONER

## 2021-04-06 RX ORDER — PREGABALIN 100 MG/1
100 CAPSULE ORAL 3 TIMES DAILY
Qty: 90 CAPSULE | Refills: 1 | Status: SHIPPED | OUTPATIENT
Start: 2021-04-06 | End: 2021-05-20 | Stop reason: SDUPTHER

## 2021-04-06 RX ORDER — PREGABALIN 100 MG/1
100 CAPSULE ORAL 3 TIMES DAILY
Qty: 90 CAPSULE | Refills: 1 | Status: SHIPPED | OUTPATIENT
Start: 2021-04-06 | End: 2021-04-06

## 2021-04-06 ASSESSMENT — ENCOUNTER SYMPTOMS
CONSTIPATION: 0
BACK PAIN: 1
DIARRHEA: 0
SHORTNESS OF BREATH: 0
COUGH: 1

## 2021-04-06 NOTE — PROGRESS NOTES
Patient is here today to review medication contract. Chief Complaint:  Back pain    [facility]     Patient complains of diffuse pain throughout her body. She does have a history of Lupus and Fibromyalgia. She has been taking Lyrica 100mg TID and reports mild relief. Patient has a history of ETOH abuse.  She uses medical marijuana for pain relief. She states she has had PT in the past but this made her pain worse.     HPI:     Neck Pain   This is a chronic problem. The current episode started more than 1 year ago. The problem occurs constantly. The problem has been unchanged. The pain is associated with nothing. The pain is present in the left side, midline and right side. The quality of the pain is described as shooting, stabbing and aching. The pain is at a severity of 7/10. The pain is moderate. Nothing aggravates the symptoms. The pain is same all the time. Associated symptoms include numbness and tingling. Pertinent negatives include no chest pain or fever. She has tried acetaminophen, bed rest, heat and home exercises for the symptoms. The treatment provided mild relief. Back Pain  This is a chronic problem. The current episode started more than 1 year ago. The problem occurs constantly. The problem is unchanged. The pain is present in the lumbar spine. The quality of the pain is described as aching. The pain does not radiate. The pain is at a severity of 7/10. The pain is moderate. The pain is the same all the time. The symptoms are aggravated by bending, position and standing. Associated symptoms include numbness, paresthesias and tingling. Pertinent negatives include no chest pain or fever. Risk factors include lack of exercise, menopause and obesity. She has tried analgesics, heat and bed rest for the symptoms. The treatment provided mild relief. Patient denies any new neurological symptoms. No bowel or bladder incontinence, no weakness, and no falling.     Pill count: n/a             Past Medical tablet, , Disp: , Rfl:     promethazine (PHENERGAN) 25 MG tablet, Take 25 mg by mouth daily (with breakfast), Disp: , Rfl:     QUEtiapine (SEROQUEL) 200 MG tablet, Take 400 mg by mouth nightly, Disp: , Rfl:     rOPINIRole (REQUIP) 2 MG tablet, , Disp: , Rfl:     tiZANidine (ZANAFLEX) 4 MG tablet, , Disp: , Rfl:     verapamil (CALAN SR) 180 MG extended release tablet, Take 180 mg by mouth nightly, Disp: , Rfl:     vitamin E 100 units capsule, Take 100 Units by mouth, Disp: , Rfl:     SENNA-S 8.6-50 MG per tablet, , Disp: , Rfl:     hydroxychloroquine (PLAQUENIL) 200 MG tablet, , Disp: , Rfl:     fluticasone (FLONASE) 50 MCG/ACT nasal spray, 2 sprays by Nasal route daily, Disp: , Rfl:     dicyclomine (BENTYL) 10 MG capsule, Take 10 mg by mouth, Disp: , Rfl:     fenofibrate (TRIGLIDE) 160 MG tablet, Take 160 mg by mouth, Disp: , Rfl:     diazePAM (VALIUM) 10 MG tablet, Take 10 mg by mouth 3 times daily as needed. , Disp: , Rfl:     citalopram (CELEXA) 40 MG tablet, Take 40 mg by mouth, Disp: , Rfl:     ciprofloxacin-dexamethasone (CIPRODEX) 0.3-0.1 % otic suspension, 3 times daily as needed, Disp: , Rfl:     D3-1000 25 MCG (1000 UT) TABS tablet, , Disp: , Rfl:     beclomethasone (QVAR REDIHALER) 80 MCG/ACT AERB inhaler, Inhale 160 mcg into the lungs 2 times daily, Disp: , Rfl:     ARIPiprazole (ABILIFY) 10 MG tablet, Take 10 mg by mouth, Disp: , Rfl:     PROVENTIL  (90 Base) MCG/ACT inhaler, , Disp: , Rfl:     esomeprazole (NEXIUM) 20 MG delayed release capsule, Take 20 mg by mouth daily (with breakfast), Disp: , Rfl:     Cyanocobalamin (VITAMIN B 12 PO), Take 50 mcg by mouth, Disp: , Rfl:     traZODone (DESYREL) 100 MG tablet, , Disp: , Rfl:     potassium chloride (K-DUR) 10 MEQ tablet, , Disp: , Rfl:     omeprazole (PRILOSEC) 40 MG capsule, , Disp: , Rfl:     cyclobenzaprine (FLEXERIL) 10 MG tablet, , Disp: , Rfl:     furosemide (LASIX) 40 MG tablet, , Disp: , Rfl:     atorvastatin for numbness, paresthesias and tingling. Physical Exam:  Ht 5' 4\" (1.626 m)   Wt 155 lb (70.3 kg)   BMI 26.61 kg/m²     Physical Exam    Record/Diagnostics Review:    Last wong 10/2020 and was +THC    Assessment:  Problem List Items Addressed This Visit     Fibromyalgia    Other chronic pain             Treatment Plan:  Patient relates current medications are helping the pain. Patient reports taking pain medications as prescribed, denies obtaining medications from different sources and denies use of illegal drugs. Patient denies side effects from medications like nausea, vomiting, constipation or drowsiness. Patient reports current activities of daily living are possible due to medications and would like to continue them. As always, we encourage daily stretching and strengthening exercises, and recommend minimizing use of pain medications unless patient cannot get through daily activities due to pain. Continue Lyrica 100 mg TID  Follow up 8 weeks     Janette Marcano, was evaluated through a synchronous (real-time) audio-video encounter. The patient (or guardian if applicable) is aware that this is a billable service. Verbal consent to proceed has been obtained within the past 12 months. The visit was conducted pursuant to the emergency declaration under the [facility], [address] [place] [address] authority and the [employer] General Act. Patient identification was verified, and a caregiver was present when appropriate.  The patient was located in a state where the provider was credentialed to provide care.

## 2021-05-20 ENCOUNTER — TELEPHONE (OUTPATIENT)
Dept: PAIN MANAGEMENT | Age: 56
End: 2021-05-20

## 2021-05-20 ENCOUNTER — VIRTUAL VISIT (OUTPATIENT)
Dept: PAIN MANAGEMENT | Age: 56
End: 2021-05-20
Payer: COMMERCIAL

## 2021-05-20 DIAGNOSIS — G89.29 OTHER CHRONIC PAIN: ICD-10-CM

## 2021-05-20 DIAGNOSIS — M79.7 FIBROMYALGIA: ICD-10-CM

## 2021-05-20 PROCEDURE — 99213 OFFICE O/P EST LOW 20 MIN: CPT | Performed by: NURSE PRACTITIONER

## 2021-05-20 RX ORDER — PREGABALIN 100 MG/1
100 CAPSULE ORAL 3 TIMES DAILY
Qty: 90 CAPSULE | Refills: 1 | Status: SHIPPED | OUTPATIENT
Start: 2021-05-20 | End: 2021-06-07 | Stop reason: SDUPTHER

## 2021-05-20 ASSESSMENT — ENCOUNTER SYMPTOMS
BACK PAIN: 1
CONSTIPATION: 0
SHORTNESS OF BREATH: 0
COUGH: 0

## 2021-05-20 NOTE — TELEPHONE ENCOUNTER
Patient stated her provider did not contact her for her 2:40 VV for today. Patient would like a call and can be reached at [phone]. Okay to leave a message. Office was unavailable.

## 2021-05-20 NOTE — PROGRESS NOTES
Patient completed a video visit today to review medication contract. Chief Complaint: multiple pain complaints    [facility] Patient complains of diffuse pain throughout her body. She does have a history of Lupus and Fibromyalgia. She has been taking Lyrica 100 mg BID with mild relief. At recent visit with Dr. Michael Lewis, taking 100 mg TID was discussed. Patient started to take Lyrica 100 mg TID and reports mild relief. Patient has a history of ETOH abuse.  She uses medical marijuana for pain relief.   She states she has had PT in the past but this made her pain worse      Back Pain  This is a chronic problem. The current episode started more than 1 year ago. The problem occurs constantly. The problem is unchanged. The pain is present in the lumbar spine. The quality of the pain is described as aching. The pain is at a severity of 8/10. The pain is moderate. The symptoms are aggravated by position and standing (walking). Pertinent negatives include no chest pain, fever, numbness, paresthesias or tingling. She has tried analgesics and bed rest for the symptoms. The treatment provided mild relief. Patient denies any new neurological symptoms. No bowel or bladder incontinence, no weakness, and no falling.       Past Medical History:   Diagnosis Date    Anxiety     Fibromyalgia     Gastric ulcer     Lupus ([address]     Reflux gastritis     Restless leg syndrome     Rheumatoid arthritis (HCC)        Past Surgical History:   Procedure Laterality Date    LARYNX SURGERY      OVARIAN CYST REMOVAL         Allergies   Allergen Reactions    Macrolides And Ketolides      Other reaction(s): Abdominal Pain    Erythromycin Nausea Only    Ketorolac Nausea Only    Nsaids      Gastric ulcers    Pcn [Penicillins] Hives    Sulfa Antibiotics Hives and Itching    Sulfamethoxazole-Trimethoprim     Ciprofloxacin Nausea And Vomiting     Pain, lethargic         Current Outpatient Medications:     pregabalin (LYRICA) 100 MG capsule, Take 1 capsule by mouth 3 times daily for 30 days. , Disp: 90 capsule, Rfl: 1    diclofenac (VOLTAREN) 75 MG EC tablet, Take 75 mg by mouth, Disp: , Rfl:     isosorbide mononitrate (IMDUR) 30 MG extended release tablet, Take 30 mg by mouth nightly, Disp: , Rfl:     leflunomide (ARAVA) 20 MG tablet, , Disp: , Rfl:     lisinopril-hydroCHLOROthiazide (PRINZIDE;ZESTORETIC) 20-12.5 MG per tablet, Take 1 tablet by mouth, Disp: , Rfl:     metoprolol succinate (TOPROL XL) 25 MG extended release tablet, Take 25 mg by mouth daily (with breakfast), Disp: , Rfl:     ondansetron (ZOFRAN-ODT) 8 MG TBDP disintegrating tablet, , Disp: , Rfl:     oxybutynin (DITROPAN-XL) 10 MG extended release tablet, Take 10 mg by mouth, Disp: , Rfl:     oxyCODONE-acetaminophen (PERCOCET)  MG per tablet, Take 1 tablet by mouth every 4-6 hours as needed. , Disp: , Rfl:     pantoprazole (PROTONIX) 40 MG tablet, , Disp: , Rfl:     potassium chloride (KLOR-CON M) 20 MEQ extended release tablet, Take 1 tablet by mouth daily (with breakfast), Disp: , Rfl:     pravastatin (PRAVACHOL) 40 MG tablet, Take 40 mg by mouth, Disp: , Rfl:     prazosin (MINIPRESS) 2 MG capsule, Take 2 mg by mouth nightly, Disp: , Rfl:     predniSONE (DELTASONE) 5 MG tablet, , Disp: , Rfl:     promethazine (PHENERGAN) 25 MG tablet, Take 25 mg by mouth daily (with breakfast), Disp: , Rfl:     QUEtiapine (SEROQUEL) 200 MG tablet, Take 400 mg by mouth nightly, Disp: , Rfl:     rOPINIRole (REQUIP) 2 MG tablet, , Disp: , Rfl:     tiZANidine (ZANAFLEX) 4 MG tablet, , Disp: , Rfl:     verapamil (CALAN SR) 180 MG extended release tablet, Take 180 mg by mouth nightly, Disp: , Rfl:     vitamin E 100 units capsule, Take 100 Units by mouth, Disp: , Rfl:     SENNA-S 8.6-50 MG per tablet, , Disp: , Rfl:     hydroxychloroquine (PLAQUENIL) 200 MG tablet, , Disp: , Rfl:     fluticasone (FLONASE) 50 MCG/ACT nasal spray, 2 sprays by Nasal route daily, Disp: , Rfl:   dicyclomine (BENTYL) 10 MG capsule, Take 10 mg by mouth, Disp: , Rfl:     fenofibrate (TRIGLIDE) 160 MG tablet, Take 160 mg by mouth, Disp: , Rfl:     diazePAM (VALIUM) 10 MG tablet, Take 10 mg by mouth 3 times daily as needed. , Disp: , Rfl:     citalopram (CELEXA) 40 MG tablet, Take 40 mg by mouth, Disp: , Rfl:     ciprofloxacin-dexamethasone (CIPRODEX) 0.3-0.1 % otic suspension, 3 times daily as needed, Disp: , Rfl:     D3-1000 25 MCG (1000 UT) TABS tablet, , Disp: , Rfl:     beclomethasone (QVAR REDIHALER) 80 MCG/ACT AERB inhaler, Inhale 160 mcg into the lungs 2 times daily, Disp: , Rfl:     ARIPiprazole (ABILIFY) 10 MG tablet, Take 10 mg by mouth, Disp: , Rfl:     PROVENTIL  (90 Base) MCG/ACT inhaler, , Disp: , Rfl:     esomeprazole (NEXIUM) 20 MG delayed release capsule, Take 20 mg by mouth daily (with breakfast), Disp: , Rfl:     Cyanocobalamin (VITAMIN B 12 PO), Take 50 mcg by mouth, Disp: , Rfl:     traZODone (DESYREL) 100 MG tablet, , Disp: , Rfl:     potassium chloride (K-DUR) 10 MEQ tablet, , Disp: , Rfl:     omeprazole (PRILOSEC) 40 MG capsule, , Disp: , Rfl:     cyclobenzaprine (FLEXERIL) 10 MG tablet, , Disp: , Rfl:     furosemide (LASIX) 40 MG tablet, , Disp: , Rfl:     atorvastatin (LIPITOR) 20 MG tablet, , Disp: , Rfl:     Family History   Problem Relation Age of Onset    Liver Cancer Maternal Grandfather        Social History     Socioeconomic History    Marital status:      Spouse name: Not on file    Number of children: Not on file    Years of education: Not on file    Highest education level: Not on file   Occupational History    Not on file   Tobacco Use    Smoking status: Light Tobacco Smoker    Smokeless tobacco: Never Used   Substance and Sexual Activity    Alcohol use: Not Currently    Drug use: Yes     Frequency: 2.0 times per week     Types: Marijuana     Comment: medical marijuana  twice a week as of 10/22/20    Sexual activity: Not on file Other Topics Concern    Not on file   Social History Narrative    Not on file     Social Determinants of Health     Financial Resource Strain:     Difficulty of Paying Living Expenses:    Food Insecurity:     Worried About Running Out of Food in the Last Year:     [address] in the Last Year:    Transportation Needs:     Lack of Transportation (Medical):  Lack of Transportation (Non-Medical):    Physical Activity:     Days of Exercise per Week:     Minutes of Exercise per Session:    Stress:     Feeling of Stress :    Social Connections:     Frequency of Communication with Friends and Family:     Frequency of Social Gatherings with Friends and Family:     Attends [employer] Services:     Active Member of Clubs or Organizations:     Attends Club or Organization Meetings:     Marital Status:    Intimate Partner Violence:     Fear of Current or Ex-Partner:     Emotionally Abused:     Physically Abused:     Sexually Abused:        Review of Systems:  Review of Systems   Constitutional: Negative for chills and fever. Cardiovascular: Negative for chest pain and palpitations. Respiratory: Negative for cough and shortness of breath. Musculoskeletal: Positive for back pain. Gastrointestinal: Negative for constipation. Neurological: Negative for disturbances in coordination, loss of balance, numbness, paresthesias and tingling. Physical Exam:  There were no vitals taken for this visit. Physical Exam  HENT:      Head: Normocephalic. Pulmonary:      Effort: Pulmonary effort is normal.   Neurological:      Mental Status: She is alert.    Psychiatric:         Mood and Affect: Mood normal.         Behavior: Behavior normal.         Record/Diagnostics Review:    Last wong 10/2021 and was appropriate     Assessment:  Problem List Items Addressed This Visit     Fibromyalgia    Relevant Medications    pregabalin (LYRICA) 100 MG capsule    Other chronic pain    Relevant Medications pregabalin (LYRICA) 100 MG capsule             Treatment Plan:  Continue Lyrica  Follow up in 4 weeks  UDS next visit    [address] [place] [address], was evaluated through a synchronous (real-time) audio-video encounter. The patient (or guardian if applicable) is aware that this is a billable service. Verbal consent to proceed has been obtained within the past 12 months. The visit was conducted pursuant to the emergency declaration under the [address] [place] [address] and the [employer] General Act. Patient identification was verified, and a caregiver was present when appropriate. The patient was located in a state where the provider was credentialed to provide care. Total time spent for this encounter: Not billed by time    --DHEERAJ Morin CNP on 5/20/2021 at 4:08 PM    An electronic signature was used to authenticate this note.

## 2021-05-27 ENCOUNTER — NURSE TRIAGE (OUTPATIENT)
Dept: OTHER | Facility: CLINIC | Age: 56
End: 2021-05-27

## 2021-05-27 NOTE — TELEPHONE ENCOUNTER
Received call from [address] at [facility] [place] with [employer] Complaint. Brief description of triage: pain to hands and feet, pt reports muscle aches to legs, states she has spoke to PCP regarding muscle pain and has an appt next week, pt states PCP advised her to f/u with pain management as well due to pain in hands and feet. Triage indicates for patient to be seen within 3 days     Care advice provided, patient verbalizes understanding; denies any other questions or concerns; instructed to call back for any new or worsening symptoms. Writer provided warm transfer to [facility] for appointment scheduling. Attention Provider: Thank you for allowing me to participate in the care of your patient. The patient was connected to triage in response to information provided to the ECC. Please do not respond through this encounter as the response is not directed to a shared pool. Reason for Disposition   [1] MODERATE pain (e.g., interferes with normal activities) AND [2] present > 3 days    Answer Assessment - Initial Assessment Questions  1. ONSET: \"When did the muscle aches or body pains start? \"   5 days     2. LOCATION: \"What part of your body is hurting? \" (e.g., entire body, arms, legs)       Muscle pain in legs and pain in hands and feet        3. SEVERITY: \"How bad is the pain? \" (Scale 1-10; or mild, moderate, severe)    - MILD (1-3): doesn't interfere with normal activities     - MODERATE (4-7): interferes with normal activities or awakens from sleep     - SEVERE (8-10):  excruciating pain, unable to do any normal activities       7/10    4. CAUSE: \"What do you think is causing the pains? \"      Unsure, made an appt with PCP for muscle pain and thinks other pain could be nerve pain    5. FEVER: \"Have you been having fever? \"      No    6. OTHER SYMPTOMS: \"Do you have any other symptoms? \" (e.g., chest pain, weakness, rash, cold or flu symptoms, weight loss)      No    7.  PREGNANCY:

## 2021-06-04 ENCOUNTER — TELEPHONE (OUTPATIENT)
Dept: PAIN MANAGEMENT | Age: 56
End: 2021-06-04

## 2021-06-04 ASSESSMENT — ENCOUNTER SYMPTOMS: RESPIRATORY NEGATIVE: 1

## 2021-06-04 NOTE — PROGRESS NOTES
HPI:     Foot Pain   The pain is present in the left foot and left ankle. This is a chronic problem. The current episode started today. The problem occurs constantly. Associated symptoms include joint swelling, numbness, stiffness and tingling. Chronic diffuse pain throughout including her hands and her legs. History of lupus as well as rheumatoid arthritis. She also history of fibromyalgia. She takes Lyrica 100 mg 3 times a day with some benefit. She feels her rheumatoid arthritis has been flaring up lately and has an appoint with rheumatology pending. She has been sober for 3 years. Does use medical marijuana in the state of [place]. Patient denies any new neurological symptoms. Nobowel or bladder incontinence, no weakness, and no falling. OARRS error    Past Medical History:   Diagnosis Date    Anxiety     Fibromyalgia     Gastric ulcer     Lupus ([facility] [address]     Reflux gastritis     Restless leg syndrome     Rheumatoid arthritis (HCC)        Past Surgical History:   Procedure Laterality Date    LARYNX SURGERY      OVARIAN CYST REMOVAL         Allergies   Allergen Reactions    Macrolides And Ketolides      Other reaction(s): Abdominal Pain    Erythromycin Nausea Only    Ketorolac Nausea Only    Nsaids      Gastric ulcers    Pcn [Penicillins] Hives    Sulfa Antibiotics Hives and Itching    Sulfamethoxazole-Trimethoprim     Ciprofloxacin Nausea And Vomiting     Pain, lethargic         Current Outpatient Medications:     pregabalin (LYRICA) 100 MG capsule, Take 1 capsule by mouth 3 times daily for 30 days. , Disp: 90 capsule, Rfl: 1    diazePAM (VALIUM) 2 MG tablet, , Disp: , Rfl:     baclofen (LIORESAL) 10 MG tablet, , Disp: , Rfl:     diclofenac (VOLTAREN) 75 MG EC tablet, Take 75 mg by mouth, Disp: , Rfl:     isosorbide mononitrate (IMDUR) 30 MG extended release tablet, Take 30 mg by mouth nightly, Disp: , Rfl:     leflunomide (ARAVA) 20 MG tablet, , Disp: , Rfl:    lisinopril-hydroCHLOROthiazide (PRINZIDE;ZESTORETIC) 20-12.5 MG per tablet, Take 1 tablet by mouth, Disp: , Rfl:     metoprolol succinate (TOPROL XL) 25 MG extended release tablet, Take 25 mg by mouth daily (with breakfast), Disp: , Rfl:     ondansetron (ZOFRAN-ODT) 8 MG TBDP disintegrating tablet, , Disp: , Rfl:     oxybutynin (DITROPAN-XL) 10 MG extended release tablet, Take 10 mg by mouth, Disp: , Rfl:     pantoprazole (PROTONIX) 40 MG tablet, , Disp: , Rfl:     potassium chloride (KLOR-CON M) 20 MEQ extended release tablet, Take 1 tablet by mouth daily (with breakfast), Disp: , Rfl:     pravastatin (PRAVACHOL) 40 MG tablet, Take 40 mg by mouth, Disp: , Rfl:     prazosin (MINIPRESS) 2 MG capsule, Take 2 mg by mouth nightly, Disp: , Rfl:     predniSONE (DELTASONE) 5 MG tablet, , Disp: , Rfl:     promethazine (PHENERGAN) 25 MG tablet, Take 25 mg by mouth daily (with breakfast), Disp: , Rfl:     QUEtiapine (SEROQUEL) 200 MG tablet, Take 400 mg by mouth nightly, Disp: , Rfl:     rOPINIRole (REQUIP) 2 MG tablet, , Disp: , Rfl:     tiZANidine (ZANAFLEX) 4 MG tablet, , Disp: , Rfl:     verapamil (CALAN SR) 180 MG extended release tablet, Take 180 mg by mouth nightly, Disp: , Rfl:     vitamin E 100 units capsule, Take 100 Units by mouth, Disp: , Rfl:     SENNA-S 8.6-50 MG per tablet, , Disp: , Rfl:     hydroxychloroquine (PLAQUENIL) 200 MG tablet, , Disp: , Rfl:     fluticasone (FLONASE) 50 MCG/ACT nasal spray, 2 sprays by Nasal route daily, Disp: , Rfl:     dicyclomine (BENTYL) 10 MG capsule, Take 10 mg by mouth, Disp: , Rfl:     fenofibrate (TRIGLIDE) 160 MG tablet, Take 160 mg by mouth, Disp: , Rfl:     citalopram (CELEXA) 40 MG tablet, Take 40 mg by mouth, Disp: , Rfl:     ciprofloxacin-dexamethasone (CIPRODEX) 0.3-0.1 % otic suspension, 3 times daily as needed, Disp: , Rfl:     D3-1000 25 MCG (1000 UT) TABS tablet, , Disp: , Rfl:     beclomethasone (QVAR REDIHALER) 80 MCG/ACT AERB inhaler, Inhale 160 mcg into the lungs 2 times daily, Disp: , Rfl:     ARIPiprazole (ABILIFY) 10 MG tablet, Take 10 mg by mouth, Disp: , Rfl:     PROVENTIL  (90 Base) MCG/ACT inhaler, , Disp: , Rfl:     esomeprazole (NEXIUM) 20 MG delayed release capsule, Take 20 mg by mouth daily (with breakfast), Disp: , Rfl:     Cyanocobalamin (VITAMIN B 12 PO), Take 50 mcg by mouth, Disp: , Rfl:     traZODone (DESYREL) 100 MG tablet, , Disp: , Rfl:     potassium chloride (K-DUR) 10 MEQ tablet, , Disp: , Rfl:     omeprazole (PRILOSEC) 40 MG capsule, , Disp: , Rfl:     cyclobenzaprine (FLEXERIL) 10 MG tablet, , Disp: , Rfl:     furosemide (LASIX) 40 MG tablet, , Disp: , Rfl:     atorvastatin (LIPITOR) 20 MG tablet, , Disp: , Rfl:     senna (SENOKOT) 8.6 MG TABS tablet, , Disp: , Rfl:     FLOVENT  MCG/ACT inhaler, , Disp: , Rfl:      MG capsule, , Disp: , Rfl:     FEOSOL NATURAL RELEASE 45 MG TABS, , Disp: , Rfl:     atorvastatin (LIPITOR) 80 MG tablet, , Disp: , Rfl:     oxyCODONE-acetaminophen (PERCOCET)  MG per tablet, Take 1 tablet by mouth every 4-6 hours as needed. (Patient not taking: Reported on 6/4/2021), Disp: , Rfl:     diazePAM (VALIUM) 10 MG tablet, Take 10 mg by mouth 3 times daily as needed. , Disp: , Rfl:     Family History   Problem Relation Age of Onset    Liver Cancer Maternal Grandfather        Social History     Socioeconomic History    Marital status:      Spouse name: Not on file    Number of children: Not on file    Years of education: Not on file    Highest education level: Not on file   Occupational History    Not on file   Tobacco Use    Smoking status: Light Tobacco Smoker    Smokeless tobacco: Never Used   Substance and Sexual Activity    Alcohol use: Not Currently    Drug use: Yes     Frequency: 2.0 times per week     Types: Marijuana     Comment: medical marijuana  twice a week as of 10/22/20    Sexual activity: Not on file   Other Topics Concern  Not on file   Social History Narrative    Not on file     Social Determinants of Health     Financial Resource Strain:     Difficulty of Paying Living Expenses:    Food Insecurity:     Worried About Running Out of Food in the Last Year:     [address] in the Last Year:    Transportation Needs:     Lack of Transportation (Medical):  Lack of Transportation (Non-Medical):    Physical Activity:     Days of Exercise per Week:     Minutes of Exercise per Session:    Stress:     Feeling of Stress :    Social Connections:     Frequency of Communication with Friends and Family:     Frequency of Social Gatherings with Friends and Family:     Attends Latter day Services:     Active Member of Clubs or Organizations:     Attends Club or Organization Meetings:     Marital Status:    Intimate Partner Violence:     Fear of Current or Ex-Partner:     Emotionally Abused:     Physically Abused:     Sexually Abused:        Review of Systems:  Review of Systems   Constitutional: Negative. Cardiovascular: Negative. Respiratory: Negative. Musculoskeletal: Positive for stiffness. Foot pain    Neurological: Positive for numbness and tingling. Physical Exam:  /68   Pulse 81   Resp 15   Ht 5' 4.5\" (1.638 m)   Wt 150 lb (68 kg)   Breastfeeding No   BMI 25.35 kg/m²     Physical Exam  Constitutional:       Appearance: Normal appearance. Pulmonary:      Effort: Pulmonary effort is normal.   Neurological:      Mental Status: She is alert. Psychiatric:         Attention and Perception: Attention and perception normal.         Mood and Affect: Mood and affect normal.         Record/Diagnostics Review:    As above, I did review the imaging    Orders:    Orders Placed This Encounter   Medications    pregabalin (LYRICA) 100 MG capsule     Sig: Take 1 capsule by mouth 3 times daily for 30 days. Dispense:  90 capsule     Refill:  1       Assessment:  1. Fibromyalgia    2.  Other chronic pain        Treatment Plan:  DISCUSSION: Treatment options discussed with patient and all questions answered to patient's satisfaction. TREATMENT OPTIONS:     Discussed the importance of continued physical therapy and exercise program as well. Continue Lyrica 100 mg 3 times daily. She has rheumatology evaluation pending. 2 month f/u       Matilde Batres M.D. I have reviewed the chief complaint and history of present illness (including ROS and PFSH) and vital documentation by my staff and I agree with their documentation and have added where applicable.

## 2021-06-07 ENCOUNTER — OFFICE VISIT (OUTPATIENT)
Dept: PAIN MANAGEMENT | Age: 56
End: 2021-06-07
Payer: COMMERCIAL

## 2021-06-07 VITALS
HEART RATE: 81 BPM | HEIGHT: 65 IN | BODY MASS INDEX: 24.99 KG/M2 | WEIGHT: 150 LBS | SYSTOLIC BLOOD PRESSURE: 107 MMHG | DIASTOLIC BLOOD PRESSURE: 68 MMHG | RESPIRATION RATE: 15 BRPM

## 2021-06-07 DIAGNOSIS — M79.7 FIBROMYALGIA: ICD-10-CM

## 2021-06-07 DIAGNOSIS — G89.29 OTHER CHRONIC PAIN: ICD-10-CM

## 2021-06-07 PROCEDURE — 99213 OFFICE O/P EST LOW 20 MIN: CPT | Performed by: PAIN MEDICINE

## 2021-06-07 RX ORDER — ATORVASTATIN CALCIUM 80 MG/1
TABLET, FILM COATED ORAL
COMMUNITY
Start: 2021-04-11

## 2021-06-07 RX ORDER — SENNOSIDES 8.6 MG
TABLET ORAL
COMMUNITY
Start: 2021-04-30 | End: 2022-04-07

## 2021-06-07 RX ORDER — BACLOFEN 10 MG/1
TABLET ORAL
COMMUNITY
Start: 2021-05-14 | End: 2022-06-09

## 2021-06-07 RX ORDER — FERROUS SULFATE 325(65) MG
TABLET ORAL
COMMUNITY
Start: 2021-05-08

## 2021-06-07 RX ORDER — PREGABALIN 100 MG/1
100 CAPSULE ORAL 3 TIMES DAILY
Qty: 90 CAPSULE | Refills: 1 | Status: SHIPPED | OUTPATIENT
Start: 2021-06-07 | End: 2021-07-15 | Stop reason: SDUPTHER

## 2021-06-07 RX ORDER — DIAZEPAM 2 MG/1
TABLET ORAL
COMMUNITY
Start: 2021-05-13 | End: 2022-04-07 | Stop reason: ALTCHOICE

## 2021-06-07 RX ORDER — FLUTICASONE PROPIONATE 220 UG/1
AEROSOL, METERED RESPIRATORY (INHALATION)
COMMUNITY
Start: 2021-04-11 | End: 2022-06-09

## 2021-06-07 RX ORDER — DOCUSATE SODIUM 100 MG/1
CAPSULE, LIQUID FILLED ORAL
COMMUNITY
Start: 2021-06-06 | End: 2022-06-09

## 2021-07-15 ENCOUNTER — VIRTUAL VISIT (OUTPATIENT)
Dept: PAIN MANAGEMENT | Age: 56
End: 2021-07-15
Payer: COMMERCIAL

## 2021-07-15 DIAGNOSIS — M79.7 FIBROMYALGIA: ICD-10-CM

## 2021-07-15 DIAGNOSIS — G89.29 OTHER CHRONIC PAIN: ICD-10-CM

## 2021-07-15 PROCEDURE — 99213 OFFICE O/P EST LOW 20 MIN: CPT | Performed by: NURSE PRACTITIONER

## 2021-07-15 RX ORDER — PREGABALIN 100 MG/1
100 CAPSULE ORAL 3 TIMES DAILY
Qty: 90 CAPSULE | Refills: 1 | Status: SHIPPED | OUTPATIENT
Start: 2021-07-23 | End: 2021-09-23 | Stop reason: SDUPTHER

## 2021-07-15 ASSESSMENT — ENCOUNTER SYMPTOMS
SHORTNESS OF BREATH: 0
CONSTIPATION: 0
COUGH: 0

## 2021-07-15 NOTE — PROGRESS NOTES
Patient completed a video visit today to review medication contract. Chief Complaint: multipel pain complaints    [facility]  Patient complains of diffuse pain throughout her body. She does have a history of Lupus and Fibromyalgia. She has been taking Lyrica 100 mg BID with mild relief. At recent visit with Dr. Susannah Quiros, taking 100 mg TID was discussed. Patient started to take Lyrica 100 mg TID and reports mild relief. Patient has a history of ETOH abuse.  She uses medical marijuana for pain relief.   She states she has had PT in the past but this made her pain worse       Today, she reports she has been losing feeling in her legs and falling. Pt is advised to go to ER. Foot Pain   The pain is present in the right foot and left foot. This is a chronic problem. The current episode started more than 1 year ago. The problem occurs constantly. The problem has been unchanged. The quality of the pain is described as aching and burning. The pain is at a severity of 5/10. The pain is moderate. Associated symptoms include numbness. Pertinent negatives include no fever. The symptoms are aggravated by activity. She has tried rest for the symptoms. The treatment provided mild relief. No bowel or bladder incontinence. .    Due for Lyrica 7/23    Past Medical History:   Diagnosis Date    Anxiety     Fibromyalgia     Gastric ulcer     Lupus ([address]     Reflux gastritis     Restless leg syndrome     Rheumatoid arthritis (HCC)        Past Surgical History:   Procedure Laterality Date    LARYNX SURGERY      OVARIAN CYST REMOVAL         Allergies   Allergen Reactions    Macrolides And Ketolides      Other reaction(s): Abdominal Pain    Erythromycin Nausea Only    Ketorolac Nausea Only    Nsaids      Gastric ulcers    Pcn [Penicillins] Hives    Sulfa Antibiotics Hives and Itching    Sulfamethoxazole-Trimethoprim     Ciprofloxacin Nausea And Vomiting     Pain, lethargic         Current Outpatient Medications:    pregabalin (LYRICA) 100 MG capsule, Take 1 capsule by mouth 3 times daily for 30 days. , Disp: 90 capsule, Rfl: 1    senna (SENOKOT) 8.6 MG TABS tablet, , Disp: , Rfl:     FLOVENT  MCG/ACT inhaler, , Disp: , Rfl:      MG capsule, , Disp: , Rfl:     diazePAM (VALIUM) 2 MG tablet, , Disp: , Rfl:     FEOSOL NATURAL RELEASE 45 MG TABS, , Disp: , Rfl:     baclofen (LIORESAL) 10 MG tablet, , Disp: , Rfl:     atorvastatin (LIPITOR) 80 MG tablet, , Disp: , Rfl:     diclofenac (VOLTAREN) 75 MG EC tablet, Take 75 mg by mouth, Disp: , Rfl:     isosorbide mononitrate (IMDUR) 30 MG extended release tablet, Take 30 mg by mouth nightly, Disp: , Rfl:     leflunomide (ARAVA) 20 MG tablet, , Disp: , Rfl:     lisinopril-hydroCHLOROthiazide (PRINZIDE;ZESTORETIC) 20-12.5 MG per tablet, Take 1 tablet by mouth, Disp: , Rfl:     metoprolol succinate (TOPROL XL) 25 MG extended release tablet, Take 25 mg by mouth daily (with breakfast), Disp: , Rfl:     ondansetron (ZOFRAN-ODT) 8 MG TBDP disintegrating tablet, , Disp: , Rfl:     oxybutynin (DITROPAN-XL) 10 MG extended release tablet, Take 10 mg by mouth, Disp: , Rfl:     oxyCODONE-acetaminophen (PERCOCET)  MG per tablet, Take 1 tablet by mouth every 4-6 hours as needed.  (Patient not taking: Reported on 6/4/2021), Disp: , Rfl:     pantoprazole (PROTONIX) 40 MG tablet, , Disp: , Rfl:     potassium chloride (KLOR-CON M) 20 MEQ extended release tablet, Take 1 tablet by mouth daily (with breakfast), Disp: , Rfl:     pravastatin (PRAVACHOL) 40 MG tablet, Take 40 mg by mouth, Disp: , Rfl:     prazosin (MINIPRESS) 2 MG capsule, Take 2 mg by mouth nightly, Disp: , Rfl:     predniSONE (DELTASONE) 5 MG tablet, , Disp: , Rfl:     promethazine (PHENERGAN) 25 MG tablet, Take 25 mg by mouth daily (with breakfast), Disp: , Rfl:     QUEtiapine (SEROQUEL) 200 MG tablet, Take 400 mg by mouth nightly, Disp: , Rfl:     rOPINIRole (REQUIP) 2 MG tablet, , Disp: , Rfl:    tiZANidine (ZANAFLEX) 4 MG tablet, , Disp: , Rfl:     verapamil (CALAN SR) 180 MG extended release tablet, Take 180 mg by mouth nightly, Disp: , Rfl:     vitamin E 100 units capsule, Take 100 Units by mouth, Disp: , Rfl:     SENNA-S 8.6-50 MG per tablet, , Disp: , Rfl:     hydroxychloroquine (PLAQUENIL) 200 MG tablet, , Disp: , Rfl:     fluticasone (FLONASE) 50 MCG/ACT nasal spray, 2 sprays by Nasal route daily, Disp: , Rfl:     dicyclomine (BENTYL) 10 MG capsule, Take 10 mg by mouth, Disp: , Rfl:     fenofibrate (TRIGLIDE) 160 MG tablet, Take 160 mg by mouth, Disp: , Rfl:     diazePAM (VALIUM) 10 MG tablet, Take 10 mg by mouth 3 times daily as needed. , Disp: , Rfl:     citalopram (CELEXA) 40 MG tablet, Take 40 mg by mouth, Disp: , Rfl:     ciprofloxacin-dexamethasone (CIPRODEX) 0.3-0.1 % otic suspension, 3 times daily as needed, Disp: , Rfl:     D3-1000 25 MCG (1000 UT) TABS tablet, , Disp: , Rfl:     beclomethasone (QVAR REDIHALER) 80 MCG/ACT AERB inhaler, Inhale 160 mcg into the lungs 2 times daily, Disp: , Rfl:     ARIPiprazole (ABILIFY) 10 MG tablet, Take 10 mg by mouth, Disp: , Rfl:     PROVENTIL  (90 Base) MCG/ACT inhaler, , Disp: , Rfl:     esomeprazole (NEXIUM) 20 MG delayed release capsule, Take 20 mg by mouth daily (with breakfast), Disp: , Rfl:     Cyanocobalamin (VITAMIN B 12 PO), Take 50 mcg by mouth, Disp: , Rfl:     traZODone (DESYREL) 100 MG tablet, , Disp: , Rfl:     potassium chloride (K-DUR) 10 MEQ tablet, , Disp: , Rfl:     omeprazole (PRILOSEC) 40 MG capsule, , Disp: , Rfl:     cyclobenzaprine (FLEXERIL) 10 MG tablet, , Disp: , Rfl:     furosemide (LASIX) 40 MG tablet, , Disp: , Rfl:     atorvastatin (LIPITOR) 20 MG tablet, , Disp: , Rfl:     Family History   Problem Relation Age of Onset    Liver Cancer Maternal Grandfather        Social History     Socioeconomic History    Marital status:      Spouse name: Not on file    Number of children: Not on file  Years of education: Not on file    Highest education level: Not on file   Occupational History    Not on file   Tobacco Use    Smoking status: Light Tobacco Smoker    Smokeless tobacco: Never Used   Substance and Sexual Activity    Alcohol use: Not Currently    Drug use: Yes     Frequency: 2.0 times per week     Types: Marijuana     Comment: medical marijuana  twice a week as of 10/22/20    Sexual activity: Not on file   Other Topics Concern    Not on file   Social History Narrative    Not on file     Social Determinants of Health     Financial Resource Strain:     Difficulty of Paying Living Expenses:    Food Insecurity:     Worried About Running Out of Food in the Last Year:     [address] in the Last Year:    Transportation Needs:     Lack of Transportation (Medical):  Lack of Transportation (Non-Medical):    Physical Activity:     Days of Exercise per Week:     Minutes of Exercise per Session:    Stress:     Feeling of Stress :    Social Connections:     Frequency of Communication with Friends and Family:     Frequency of Social Gatherings with Friends and Family:     Attends Baptism Services:     Active Member of Clubs or Organizations:     Attends Club or Organization Meetings:     Marital Status:    Intimate Partner Violence:     Fear of Current or Ex-Partner:     Emotionally Abused:     Physically Abused:     Sexually Abused:        Review of Systems:  Review of Systems   Constitutional: Negative for chills and fever. Cardiovascular: Negative for chest pain and palpitations. Respiratory: Negative for cough and shortness of breath. Musculoskeletal: Positive for joint pain and myalgias. Gastrointestinal: Negative for constipation. Neurological: Positive for loss of balance, numbness, paresthesias and weakness. Negative for disturbances in coordination. Physical Exam:  There were no vitals taken for this visit.     Physical Exam  HENT:      Head: Normocephalic. Pulmonary:      Effort: Pulmonary effort is normal.   Neurological:      Mental Status: She is alert. Psychiatric:         Mood and Affect: Mood normal.         Behavior: Behavior normal.         Record/Diagnostics Review:    As reviewed above    Assessment:  Problem List Items Addressed This Visit     Fibromyalgia    Relevant Medications    pregabalin (LYRICA) 100 MG capsule (Start on 7/23/2021)    Other chronic pain    Relevant Medications    pregabalin (LYRICA) 100 MG capsule (Start on 7/23/2021)             Treatment Plan:  Continue Lyrica  Pt reports increasing weakness in her legs and states she has been falling  Advised to go to ER or PCP today for evaluation - she states she will try to be seen today  Follow up in four weeks    Janette Marcano, was evaluated through a synchronous (real-time) audio-video encounter. The patient (or guardian if applicable) is aware that this is a billable service. Verbal consent to proceed has been obtained within the past 12 months. The visit was conducted pursuant to the emergency declaration under the [address] [place] [address] authority and the [employer] Act. Patient identification was verified, and a caregiver was present when appropriate. The patient was located in a state where the provider was credentialed to provide care. Total time spent for this encounter: Not billed by time    --DHEERAJ Cao CNP on 7/15/2021 at 12:05 PM    An electronic signature was used to authenticate this note.

## 2021-09-23 ENCOUNTER — OFFICE VISIT (OUTPATIENT)
Dept: PAIN MANAGEMENT | Age: 56
End: 2021-09-23
Payer: COMMERCIAL

## 2021-09-23 VITALS
SYSTOLIC BLOOD PRESSURE: 120 MMHG | HEART RATE: 67 BPM | HEIGHT: 64 IN | RESPIRATION RATE: 15 BRPM | WEIGHT: 148.8 LBS | BODY MASS INDEX: 25.4 KG/M2 | DIASTOLIC BLOOD PRESSURE: 72 MMHG

## 2021-09-23 DIAGNOSIS — M79.7 FIBROMYALGIA: ICD-10-CM

## 2021-09-23 DIAGNOSIS — G89.29 OTHER CHRONIC PAIN: ICD-10-CM

## 2021-09-23 PROCEDURE — 99213 OFFICE O/P EST LOW 20 MIN: CPT | Performed by: NURSE PRACTITIONER

## 2021-09-23 RX ORDER — PREGABALIN 100 MG/1
100 CAPSULE ORAL 3 TIMES DAILY
Qty: 90 CAPSULE | Refills: 1 | Status: SHIPPED | OUTPATIENT
Start: 2021-09-23 | End: 2021-11-08 | Stop reason: SDUPTHER

## 2021-09-23 ASSESSMENT — ENCOUNTER SYMPTOMS
BACK PAIN: 1
COUGH: 0
CONSTIPATION: 0
SHORTNESS OF BREATH: 0

## 2021-09-23 NOTE — PROGRESS NOTES
Patient is here today for OV    Chief Complaint   Patient presents with    Medication Refill    Foot Pain    Elbow Injury         [facility] Patient complains of diffuse pain throughout her body. She does have a history of Lupus and Fibromyalgia. She has been taking Lyrica 100 mg BID with mild relief. At recent visit with Dr. Gisselle Angeles, taking 100 mg TID was discussed. Patient started to take Lyrica 100 mg TID and reports mild relief. Patient has a history of ETOH abuse.  She uses medical marijuana for pain relief.   She states she has had PT in the past but this made her pain worse. She is seeing neurology in [place] for recent fall. Has MRI ordered. She broke her right elbow in June and is following with Dr. Cristine Reed Pain   The pain is present in the right foot, right lower leg, right ankle and right toes. This is a chronic problem. The current episode started more than 1 year ago. The problem occurs constantly. The problem has been unchanged. The quality of the pain is described as pounding. The pain is at a severity of 5/10. The pain is moderate. Associated symptoms include numbness, stiffness and tingling. Pertinent negatives include no fever. The symptoms are aggravated by cold and activity. She has tried oral narcotics and rest for the symptoms. The treatment provided no relief. Her past medical history is significant for rheumatoid arthritis. Arm Injury  This is a new (right elbow) problem. The current episode started more than 1 month ago (6/30/2021). The problem occurs constantly. The problem has been gradually worsening. Associated symptoms include headaches, numbness and weakness. Pertinent negatives include no chest pain, chills, coughing or fever. The symptoms are aggravated by bending, twisting and exertion. She has tried position changes, relaxation, rest, acetaminophen, immobilization, heat and ice for the symptoms. The treatment provided mild relief.      Patient denies any new neurological symptoms. No bowel or bladder incontinence, no weakness, and no falling.     Past Medical History:   Diagnosis Date    Anxiety     Fibromyalgia     Gastric ulcer     Lupus ([address]     Reflux gastritis     Restless leg syndrome     Rheumatoid arthritis (HCC)        Past Surgical History:   Procedure Laterality Date    LARYNX SURGERY      OVARIAN CYST REMOVAL         Allergies   Allergen Reactions    Macrolides And Ketolides      Other reaction(s): Abdominal Pain    Erythromycin Nausea Only    Ketorolac Nausea Only    Nsaids      Gastric ulcers    Pcn [Penicillins] Hives    Sulfa Antibiotics Hives and Itching    Sulfamethoxazole-Trimethoprim     Ciprofloxacin Nausea And Vomiting     Pain, lethargic         Current Outpatient Medications:     senna (SENOKOT) 8.6 MG TABS tablet, , Disp: , Rfl:     FLOVENT  MCG/ACT inhaler, , Disp: , Rfl:      MG capsule, , Disp: , Rfl:     diazePAM (VALIUM) 2 MG tablet, , Disp: , Rfl:     FEOSOL NATURAL RELEASE 45 MG TABS, , Disp: , Rfl:     baclofen (LIORESAL) 10 MG tablet, , Disp: , Rfl:     atorvastatin (LIPITOR) 80 MG tablet, , Disp: , Rfl:     diclofenac (VOLTAREN) 75 MG EC tablet, Take 75 mg by mouth, Disp: , Rfl:     isosorbide mononitrate (IMDUR) 30 MG extended release tablet, Take 30 mg by mouth nightly, Disp: , Rfl:     leflunomide (ARAVA) 20 MG tablet, , Disp: , Rfl:     lisinopril-hydroCHLOROthiazide (PRINZIDE;ZESTORETIC) 20-12.5 MG per tablet, Take 1 tablet by mouth, Disp: , Rfl:     metoprolol succinate (TOPROL XL) 25 MG extended release tablet, Take 25 mg by mouth daily (with breakfast), Disp: , Rfl:     ondansetron (ZOFRAN-ODT) 8 MG TBDP disintegrating tablet, , Disp: , Rfl:     oxybutynin (DITROPAN-XL) 10 MG extended release tablet, Take 10 mg by mouth, Disp: , Rfl:     pantoprazole (PROTONIX) 40 MG tablet, , Disp: , Rfl:     potassium chloride (KLOR-CON M) 20 MEQ extended release tablet, Take 1 tablet by mouth daily (with breakfast), Disp: , Rfl:     pravastatin (PRAVACHOL) 40 MG tablet, Take 40 mg by mouth, Disp: , Rfl:     prazosin (MINIPRESS) 2 MG capsule, Take 2 mg by mouth nightly, Disp: , Rfl:     predniSONE (DELTASONE) 5 MG tablet, , Disp: , Rfl:     promethazine (PHENERGAN) 25 MG tablet, Take 25 mg by mouth daily (with breakfast), Disp: , Rfl:     QUEtiapine (SEROQUEL) 200 MG tablet, Take 400 mg by mouth nightly, Disp: , Rfl:     rOPINIRole (REQUIP) 2 MG tablet, , Disp: , Rfl:     tiZANidine (ZANAFLEX) 4 MG tablet, , Disp: , Rfl:     verapamil (CALAN SR) 180 MG extended release tablet, Take 180 mg by mouth nightly, Disp: , Rfl:     vitamin E 100 units capsule, Take 100 Units by mouth, Disp: , Rfl:     SENNA-S 8.6-50 MG per tablet, , Disp: , Rfl:     hydroxychloroquine (PLAQUENIL) 200 MG tablet, , Disp: , Rfl:     fluticasone (FLONASE) 50 MCG/ACT nasal spray, 2 sprays by Nasal route daily, Disp: , Rfl:     dicyclomine (BENTYL) 10 MG capsule, Take 10 mg by mouth, Disp: , Rfl:     fenofibrate (TRIGLIDE) 160 MG tablet, Take 160 mg by mouth, Disp: , Rfl:     diazePAM (VALIUM) 10 MG tablet, Take 10 mg by mouth 3 times daily as needed. , Disp: , Rfl:     citalopram (CELEXA) 40 MG tablet, Take 40 mg by mouth, Disp: , Rfl:     ciprofloxacin-dexamethasone (CIPRODEX) 0.3-0.1 % otic suspension, 3 times daily as needed, Disp: , Rfl:     D3-1000 25 MCG (1000 UT) TABS tablet, , Disp: , Rfl:     beclomethasone (QVAR REDIHALER) 80 MCG/ACT AERB inhaler, Inhale 160 mcg into the lungs 2 times daily, Disp: , Rfl:     ARIPiprazole (ABILIFY) 10 MG tablet, Take 10 mg by mouth, Disp: , Rfl:     PROVENTIL  (90 Base) MCG/ACT inhaler, , Disp: , Rfl:     esomeprazole (NEXIUM) 20 MG delayed release capsule, Take 20 mg by mouth daily (with breakfast), Disp: , Rfl:     Cyanocobalamin (VITAMIN B 12 PO), Take 50 mcg by mouth, Disp: , Rfl:     traZODone (DESYREL) 100 MG tablet, , Disp: , Rfl:     potassium chloride (K-DUR) 10 MEQ tablet, , Disp: , Rfl:     omeprazole (PRILOSEC) 40 MG capsule, , Disp: , Rfl:     cyclobenzaprine (FLEXERIL) 10 MG tablet, , Disp: , Rfl:     furosemide (LASIX) 40 MG tablet, , Disp: , Rfl:     atorvastatin (LIPITOR) 20 MG tablet, , Disp: , Rfl:     pregabalin (LYRICA) 100 MG capsule, Take 1 capsule by mouth 3 times daily for 30 days. , Disp: 90 capsule, Rfl: 1    oxyCODONE-acetaminophen (PERCOCET)  MG per tablet, Take 1 tablet by mouth every 4-6 hours as needed. (Patient not taking: Reported on 9/23/2021), Disp: , Rfl:     Family History   Problem Relation Age of Onset    Liver Cancer Maternal Grandfather        Social History     Socioeconomic History    Marital status:      Spouse name: Not on file    Number of children: Not on file    Years of education: Not on file    Highest education level: Not on file   Occupational History    Not on file   Tobacco Use    Smoking status: Light Tobacco Smoker    Smokeless tobacco: Never Used   Substance and Sexual Activity    Alcohol use: Not Currently    Drug use: Yes     Frequency: 2.0 times per week     Types: Marijuana     Comment: medical marijuana  twice a week as of 10/22/20    Sexual activity: Not on file   Other Topics Concern    Not on file   Social History Narrative    Not on file     Social Determinants of Health     Financial Resource Strain:     Difficulty of Paying Living Expenses:    Food Insecurity:     Worried About Running Out of Food in the Last Year:     [place] of Food in the Last Year:    Transportation Needs:     Lack of Transportation (Medical):      Lack of Transportation (Non-Medical):    Physical Activity:     Days of Exercise per Week:     Minutes of Exercise per Session:    Stress:     Feeling of Stress :    Social Connections:     Frequency of Communication with Friends and Family:     Frequency of Social Gatherings with Friends and Family:     Attends [employer] Services:     Active Member of Clubs or Organizations:     Attends Club or Organization Meetings:     Marital Status:    Intimate Partner Violence:     Fear of Current or Ex-Partner:     Emotionally Abused:     Physically Abused:     Sexually Abused:        Review of Systems:  Review of Systems   Constitutional: Negative for chills and fever. Cardiovascular: Negative for chest pain and palpitations. Respiratory: Negative for cough and shortness of breath. Musculoskeletal: Positive for back pain and stiffness. Gastrointestinal: Negative for constipation. Neurological: Positive for headaches, numbness, tingling and weakness. Negative for disturbances in coordination and loss of balance. Physical Exam:  Ht 5' 4\" (1.626 m)   Wt 148 lb 12.8 oz (67.5 kg)   Breastfeeding No   BMI 25.54 kg/m²     Physical Exam  HENT:      Head: Normocephalic. Pulmonary:      Effort: Pulmonary effort is normal.   Neurological:      Mental Status: She is alert.    Psychiatric:         Mood and Affect: Mood normal.         Behavior: Behavior normal.         Record/Diagnostics Review:    Last wong 10/2020 and was appropriate     Assessment:  Problem List Items Addressed This Visit     Fibromyalgia    Relevant Medications    pregabalin (LYRICA) 100 MG capsule    Other chronic pain    Relevant Medications    pregabalin (LYRICA) 100 MG capsule             Treatment Plan:  Continue Lyrica 100 mg TID  Pt following with orthopedics for fractured elbow - is taking tramadol from Dr. Michael Mcdaniel  Pt will schedule MRI of brain this week and follow up with neurology  Follow up in 8 weeks

## 2021-11-08 ENCOUNTER — OFFICE VISIT (OUTPATIENT)
Dept: PAIN MANAGEMENT | Age: 56
End: 2021-11-08
Payer: COMMERCIAL

## 2021-11-08 ENCOUNTER — TELEPHONE (OUTPATIENT)
Dept: PAIN MANAGEMENT | Age: 56
End: 2021-11-08

## 2021-11-08 VITALS
WEIGHT: 146.6 LBS | DIASTOLIC BLOOD PRESSURE: 72 MMHG | SYSTOLIC BLOOD PRESSURE: 123 MMHG | BODY MASS INDEX: 25.03 KG/M2 | HEIGHT: 64 IN | HEART RATE: 79 BPM

## 2021-11-08 DIAGNOSIS — G89.29 OTHER CHRONIC PAIN: ICD-10-CM

## 2021-11-08 DIAGNOSIS — M79.7 FIBROMYALGIA: ICD-10-CM

## 2021-11-08 PROCEDURE — 99213 OFFICE O/P EST LOW 20 MIN: CPT | Performed by: PAIN MEDICINE

## 2021-11-08 RX ORDER — TRAMADOL HYDROCHLORIDE 50 MG/1
50 TABLET ORAL EVERY 8 HOURS PRN
COMMUNITY
Start: 2021-09-23 | End: 2021-11-08 | Stop reason: ALTCHOICE

## 2021-11-08 RX ORDER — PREGABALIN 100 MG/1
100 CAPSULE ORAL 3 TIMES DAILY
Qty: 90 CAPSULE | Refills: 1 | Status: SHIPPED | OUTPATIENT
Start: 2021-11-23 | End: 2021-11-08 | Stop reason: SDUPTHER

## 2021-11-08 RX ORDER — PREGABALIN 100 MG/1
100 CAPSULE ORAL 3 TIMES DAILY
Qty: 90 CAPSULE | Refills: 1 | Status: SHIPPED | OUTPATIENT
Start: 2021-11-23 | End: 2022-01-18 | Stop reason: SDUPTHER

## 2021-11-08 RX ORDER — DULOXETIN HYDROCHLORIDE 30 MG/1
30 CAPSULE, DELAYED RELEASE ORAL
COMMUNITY
Start: 2021-06-14

## 2021-11-08 ASSESSMENT — ENCOUNTER SYMPTOMS: RESPIRATORY NEGATIVE: 1

## 2021-11-08 NOTE — TELEPHONE ENCOUNTER
[facility] on home # [phone]   Patient was seen in office this morning and left without her script for Lyrica, please call into pharmacy, ok per Dr. Grey Melchor.

## 2021-11-08 NOTE — PROGRESS NOTES
gastritis     Restless leg syndrome     Rheumatoid arthritis (HCC)        Past Surgical History:   Procedure Laterality Date    LARYNX SURGERY      OVARIAN CYST REMOVAL         Allergies   Allergen Reactions    Macrolides And Ketolides      Other reaction(s): Abdominal Pain    Erythromycin Nausea Only    Ketorolac Nausea Only    Nsaids      Gastric ulcers    Pcn [Penicillins] Hives    Sulfa Antibiotics Hives and Itching    Sulfamethoxazole-Trimethoprim     Ciprofloxacin Nausea And Vomiting     Pain, lethargic         Current Outpatient Medications:     DULoxetine (CYMBALTA) 30 MG extended release capsule, Take 30 mg by mouth, Disp: , Rfl:     [START ON 11/23/2021] pregabalin (LYRICA) 100 MG capsule, Take 1 capsule by mouth 3 times daily for 30 days. , Disp: 90 capsule, Rfl: 1    senna (SENOKOT) 8.6 MG TABS tablet, , Disp: , Rfl:     FLOVENT  MCG/ACT inhaler, , Disp: , Rfl:      MG capsule, , Disp: , Rfl:     diazePAM (VALIUM) 2 MG tablet, , Disp: , Rfl:     FEOSOL NATURAL RELEASE 45 MG TABS, , Disp: , Rfl:     baclofen (LIORESAL) 10 MG tablet, , Disp: , Rfl:     atorvastatin (LIPITOR) 80 MG tablet, , Disp: , Rfl:     diclofenac (VOLTAREN) 75 MG EC tablet, Take 75 mg by mouth, Disp: , Rfl:     isosorbide mononitrate (IMDUR) 30 MG extended release tablet, Take 30 mg by mouth nightly, Disp: , Rfl:     leflunomide (ARAVA) 20 MG tablet, , Disp: , Rfl:     lisinopril-hydroCHLOROthiazide (PRINZIDE;ZESTORETIC) 20-12.5 MG per tablet, Take 1 tablet by mouth, Disp: , Rfl:     metoprolol succinate (TOPROL XL) 25 MG extended release tablet, Take 25 mg by mouth daily (with breakfast), Disp: , Rfl:     ondansetron (ZOFRAN-ODT) 8 MG TBDP disintegrating tablet, , Disp: , Rfl:     oxybutynin (DITROPAN-XL) 10 MG extended release tablet, Take 10 mg by mouth, Disp: , Rfl:     pantoprazole (PROTONIX) 40 MG tablet, , Disp: , Rfl:     potassium chloride (KLOR-CON M) 20 MEQ extended release tablet, Take 1 tablet by mouth daily (with breakfast), Disp: , Rfl:     pravastatin (PRAVACHOL) 40 MG tablet, Take 40 mg by mouth, Disp: , Rfl:     prazosin (MINIPRESS) 2 MG capsule, Take 2 mg by mouth nightly, Disp: , Rfl:     predniSONE (DELTASONE) 5 MG tablet, , Disp: , Rfl:     promethazine (PHENERGAN) 25 MG tablet, Take 25 mg by mouth daily (with breakfast), Disp: , Rfl:     QUEtiapine (SEROQUEL) 200 MG tablet, Take 400 mg by mouth nightly, Disp: , Rfl:     rOPINIRole (REQUIP) 2 MG tablet, , Disp: , Rfl:     tiZANidine (ZANAFLEX) 4 MG tablet, , Disp: , Rfl:     verapamil (CALAN SR) 180 MG extended release tablet, Take 180 mg by mouth nightly, Disp: , Rfl:     vitamin E 100 units capsule, Take 100 Units by mouth, Disp: , Rfl:     SENNA-S 8.6-50 MG per tablet, , Disp: , Rfl:     hydroxychloroquine (PLAQUENIL) 200 MG tablet, , Disp: , Rfl:     fluticasone (FLONASE) 50 MCG/ACT nasal spray, 2 sprays by Nasal route daily, Disp: , Rfl:     dicyclomine (BENTYL) 10 MG capsule, Take 10 mg by mouth, Disp: , Rfl:     fenofibrate (TRIGLIDE) 160 MG tablet, Take 160 mg by mouth, Disp: , Rfl:     diazePAM (VALIUM) 10 MG tablet, Take 10 mg by mouth 3 times daily as needed. , Disp: , Rfl:     citalopram (CELEXA) 40 MG tablet, Take 40 mg by mouth, Disp: , Rfl:     ciprofloxacin-dexamethasone (CIPRODEX) 0.3-0.1 % otic suspension, 3 times daily as needed, Disp: , Rfl:     D3-1000 25 MCG (1000 UT) TABS tablet, , Disp: , Rfl:     beclomethasone (QVAR REDIHALER) 80 MCG/ACT AERB inhaler, Inhale 160 mcg into the lungs 2 times daily, Disp: , Rfl:     ARIPiprazole (ABILIFY) 10 MG tablet, Take 10 mg by mouth, Disp: , Rfl:     PROVENTIL  (90 Base) MCG/ACT inhaler, , Disp: , Rfl:     esomeprazole (NEXIUM) 20 MG delayed release capsule, Take 20 mg by mouth daily (with breakfast), Disp: , Rfl:     Cyanocobalamin (VITAMIN B 12 PO), Take 50 mcg by mouth, Disp: , Rfl:     traZODone (DESYREL) 100 MG tablet, , Disp: , Rfl:    potassium chloride (K-DUR) 10 MEQ tablet, , Disp: , Rfl:     omeprazole (PRILOSEC) 40 MG capsule, , Disp: , Rfl:     cyclobenzaprine (FLEXERIL) 10 MG tablet, , Disp: , Rfl:     furosemide (LASIX) 40 MG tablet, , Disp: , Rfl:     atorvastatin (LIPITOR) 20 MG tablet, , Disp: , Rfl:     oxyCODONE-acetaminophen (PERCOCET)  MG per tablet, Take 1 tablet by mouth every 4-6 hours as needed. (Patient not taking: Reported on 9/23/2021), Disp: , Rfl:     Family History   Problem Relation Age of Onset    Liver Cancer Maternal Grandfather        Social History     Socioeconomic History    Marital status:      Spouse name: Not on file    Number of children: Not on file    Years of education: Not on file    Highest education level: Not on file   Occupational History    Not on file   Tobacco Use    Smoking status: Light Tobacco Smoker    Smokeless tobacco: Never Used   Substance and Sexual Activity    Alcohol use: Not Currently    Drug use: Not Currently     Frequency: 2.0 times per week     Types: Marijuana ([place])     Comment: states has not used since Sept 2021    Sexual activity: Not on file   Other Topics Concern    Not on file   Social History Narrative    Not on file     Social Determinants of Health     Financial Resource Strain:     Difficulty of Paying Living Expenses: Not on file   Food Insecurity:     Worried About [address] in the Last Year: Not on file    [address] in the Last Year: Not on file   Transportation Needs:     Lack of Transportation (Medical): Not on file    Lack of Transportation (Non-Medical):  Not on file   Physical Activity:     Days of Exercise per Week: Not on file    Minutes of Exercise per Session: Not on file   Stress:     Feeling of Stress : Not on file   Social Connections:     Frequency of Communication with Friends and Family: Not on file    Frequency of Social Gatherings with Friends and Family: Not on file    Attends [employer] Services: Not on file    Active Member of Clubs or Organizations: Not on file    Attends Club or Organization Meetings: Not on file    Marital Status: Not on file   Intimate Partner Violence:     Fear of Current or Ex-Partner: Not on file    Emotionally Abused: Not on file    Physically Abused: Not on file    Sexually Abused: Not on file   Housing Stability:     Unable to Pay for Housing in the Last Year: Not on file    Number of [place] in the Last Year: Not on file    Unstable Housing in the Last Year: Not on file       Review of Systems:  Review of Systems   Constitutional: Negative. Respiratory: Negative. Musculoskeletal: Positive for arthritis, joint pain, muscle weakness and myalgias. Neurological: Positive for numbness and tingling. Physical Exam:  /72   Pulse 79   Ht 5' 4\" (1.626 m)   Wt 146 lb 9.6 oz (66.5 kg)   BMI 25.16 kg/m²     Physical Exam  Constitutional:       Appearance: Normal appearance. Pulmonary:      Effort: Pulmonary effort is normal.   Neurological:      Mental Status: She is alert. Psychiatric:         Attention and Perception: Attention and perception normal.         Mood and Affect: Mood and affect normal.         Record/Diagnostics Review:    As above, I did review the imaging    Orders:    Orders Placed This Encounter   Medications    pregabalin (LYRICA) 100 MG capsule     Sig: Take 1 capsule by mouth 3 times daily for 30 days. Dispense:  90 capsule     Refill:  1       Assessment:  1. Fibromyalgia    2. Other chronic pain        Treatment Plan:  DISCUSSION: Treatment options discussed with patient and all questions answered to patient's satisfaction. OARRS Review: Reviewed and acceptable for medications prescribed. TREATMENT OPTIONS:     Records from neurology  EMG results  Consider MRI  Continue Sania Miguel M.D.         I have reviewed the chief complaint and history of present illness (including ROS and PFSH) and vital

## 2021-12-07 ENCOUNTER — VIRTUAL VISIT (OUTPATIENT)
Dept: PAIN MANAGEMENT | Age: 56
End: 2021-12-07
Payer: COMMERCIAL

## 2021-12-07 DIAGNOSIS — M79.601 PAIN OF RIGHT UPPER EXTREMITY: ICD-10-CM

## 2021-12-07 DIAGNOSIS — M79.7 FIBROMYALGIA: Primary | ICD-10-CM

## 2021-12-07 DIAGNOSIS — G89.29 OTHER CHRONIC PAIN: ICD-10-CM

## 2021-12-07 PROCEDURE — 99213 OFFICE O/P EST LOW 20 MIN: CPT | Performed by: NURSE PRACTITIONER

## 2021-12-07 ASSESSMENT — ENCOUNTER SYMPTOMS
BACK PAIN: 1
BOWEL INCONTINENCE: 0

## 2021-12-07 NOTE — PROGRESS NOTES
Patient is here today to review medication contract. Chief Complaint   Patient presents with    Back Pain    Neck Pain    Medication Refill         [facility]     Patient complains of diffuse pain throughout her body. She does have a history of Lupus and Fibromyalgia. She has been taking Lyrica 100mg TID and reports mild relief. Patient has a history of ETOH abuse.  She was using medical marijuana for pain relief but states she stopped 2 months ago d/t coughing too much.   Today's visit was to discuss her new referral from neurologist for ongoing right elbow pain. She sustained an injury this past summer after a fall and reports ongoing pain. Did f/u with Dr Savanna Longo with no tx done per pt. She has order for brain MRI to be done this month d/t fall. She had  BLE EMG which showed minimal bilat S1 radiculopathy. She reports also getting a steroid shot in her back from neurologist that \"did not help\" Neurology notes lizzeth WELSHE EMG ordered but no results for me to review a this time        HPI:     Back Pain  The problem occurs constantly. The problem has been gradually worsening since onset. The pain is present in the lumbar spine. The quality of the pain is described as cramping, shooting and burning. The pain radiates to the right knee, right foot, right thigh, left knee, left thigh and left foot. The pain is at a severity of 8/10. The pain is worse during the day. The symptoms are aggravated by bending, sitting and standing. Associated symptoms include leg pain, numbness, tingling and weakness. Pertinent negatives include no bladder incontinence, bowel incontinence, chest pain, fever or headaches. Treatments tried: PT, Lyrica. Neck Pain   The problem occurs constantly. The problem has been unchanged. The pain is present in the right side, midline and left side. The quality of the pain is described as aching, burning, cramping, shooting and stabbing. The pain is at a severity of 8/10.  The symptoms are aggravated by twisting, position and bending. The pain is worse during the day. Stiffness is present all day. Associated symptoms include leg pain, numbness, tingling and weakness. Pertinent negatives include no chest pain, fever or headaches. Treatments tried: PT.              Past Medical History:   Diagnosis Date    Anxiety     Fibromyalgia     Gastric ulcer     Lupus ([address]     Reflux gastritis     Restless leg syndrome     Rheumatoid arthritis (HCC)        Past Surgical History:   Procedure Laterality Date    LARYNX SURGERY      OVARIAN CYST REMOVAL         Allergies   Allergen Reactions    Macrolides And Ketolides      Other reaction(s): Abdominal Pain    Erythromycin Nausea Only    Ketorolac Nausea Only    Nsaids      Gastric ulcers    Pcn [Penicillins] Hives    Sulfa Antibiotics Hives and Itching    Sulfamethoxazole-Trimethoprim     Ciprofloxacin Nausea And Vomiting     Pain, lethargic         Current Outpatient Medications:     DULoxetine (CYMBALTA) 30 MG extended release capsule, Take 30 mg by mouth, Disp: , Rfl:     pregabalin (LYRICA) 100 MG capsule, Take 1 capsule by mouth 3 times daily for 30 days. , Disp: 90 capsule, Rfl: 1    senna (SENOKOT) 8.6 MG TABS tablet, , Disp: , Rfl:     FLOVENT  MCG/ACT inhaler, , Disp: , Rfl:      MG capsule, , Disp: , Rfl:     diazePAM (VALIUM) 2 MG tablet, , Disp: , Rfl:     FEOSOL NATURAL RELEASE 45 MG TABS, , Disp: , Rfl:     baclofen (LIORESAL) 10 MG tablet, , Disp: , Rfl:     atorvastatin (LIPITOR) 80 MG tablet, , Disp: , Rfl:     diclofenac (VOLTAREN) 75 MG EC tablet, Take 75 mg by mouth, Disp: , Rfl:     isosorbide mononitrate (IMDUR) 30 MG extended release tablet, Take 30 mg by mouth nightly, Disp: , Rfl:     leflunomide (ARAVA) 20 MG tablet, , Disp: , Rfl:     lisinopril-hydroCHLOROthiazide (PRINZIDE;ZESTORETIC) 20-12.5 MG per tablet, Take 1 tablet by mouth, Disp: , Rfl:     metoprolol succinate (TOPROL XL) 25 MG extended release tablet, Take 25 mg by mouth daily (with breakfast), Disp: , Rfl:     ondansetron (ZOFRAN-ODT) 8 MG TBDP disintegrating tablet, , Disp: , Rfl:     oxybutynin (DITROPAN-XL) 10 MG extended release tablet, Take 10 mg by mouth, Disp: , Rfl:     oxyCODONE-acetaminophen (PERCOCET)  MG per tablet, Take 1 tablet by mouth every 4-6 hours as needed. , Disp: , Rfl:     pantoprazole (PROTONIX) 40 MG tablet, , Disp: , Rfl:     potassium chloride (KLOR-CON M) 20 MEQ extended release tablet, Take 1 tablet by mouth daily (with breakfast), Disp: , Rfl:     pravastatin (PRAVACHOL) 40 MG tablet, Take 40 mg by mouth, Disp: , Rfl:     prazosin (MINIPRESS) 2 MG capsule, Take 2 mg by mouth nightly, Disp: , Rfl:     predniSONE (DELTASONE) 5 MG tablet, , Disp: , Rfl:     promethazine (PHENERGAN) 25 MG tablet, Take 25 mg by mouth daily (with breakfast), Disp: , Rfl:     QUEtiapine (SEROQUEL) 200 MG tablet, Take 400 mg by mouth nightly, Disp: , Rfl:     rOPINIRole (REQUIP) 2 MG tablet, , Disp: , Rfl:     tiZANidine (ZANAFLEX) 4 MG tablet, , Disp: , Rfl:     verapamil (CALAN SR) 180 MG extended release tablet, Take 180 mg by mouth nightly, Disp: , Rfl:     vitamin E 100 units capsule, Take 100 Units by mouth, Disp: , Rfl:     SENNA-S 8.6-50 MG per tablet, , Disp: , Rfl:     hydroxychloroquine (PLAQUENIL) 200 MG tablet, , Disp: , Rfl:     fluticasone (FLONASE) 50 MCG/ACT nasal spray, 2 sprays by Nasal route daily, Disp: , Rfl:     dicyclomine (BENTYL) 10 MG capsule, Take 10 mg by mouth, Disp: , Rfl:     fenofibrate (TRIGLIDE) 160 MG tablet, Take 160 mg by mouth, Disp: , Rfl:     diazePAM (VALIUM) 10 MG tablet, Take 10 mg by mouth 3 times daily as needed. , Disp: , Rfl:     citalopram (CELEXA) 40 MG tablet, Take 40 mg by mouth, Disp: , Rfl:     ciprofloxacin-dexamethasone (CIPRODEX) 0.3-0.1 % otic suspension, 3 times daily as needed, Disp: , Rfl:     D3-1000 25 MCG (1000 UT) TABS tablet, , Disp: , Rfl:   beclomethasone (QVAR REDIHALER) 80 MCG/ACT AERB inhaler, Inhale 160 mcg into the lungs 2 times daily, Disp: , Rfl:     ARIPiprazole (ABILIFY) 10 MG tablet, Take 10 mg by mouth, Disp: , Rfl:     PROVENTIL  (90 Base) MCG/ACT inhaler, , Disp: , Rfl:     esomeprazole (NEXIUM) 20 MG delayed release capsule, Take 20 mg by mouth daily (with breakfast), Disp: , Rfl:     Cyanocobalamin (VITAMIN B 12 PO), Take 50 mcg by mouth, Disp: , Rfl:     traZODone (DESYREL) 100 MG tablet, , Disp: , Rfl:     potassium chloride (K-DUR) 10 MEQ tablet, , Disp: , Rfl:     omeprazole (PRILOSEC) 40 MG capsule, , Disp: , Rfl:     cyclobenzaprine (FLEXERIL) 10 MG tablet, , Disp: , Rfl:     furosemide (LASIX) 40 MG tablet, , Disp: , Rfl:     atorvastatin (LIPITOR) 20 MG tablet, , Disp: , Rfl:     Family History   Problem Relation Age of Onset    Liver Cancer Maternal Grandfather        Social History     Socioeconomic History    Marital status:      Spouse name: Not on file    Number of children: Not on file    Years of education: Not on file    Highest education level: Not on file   Occupational History    Not on file   Tobacco Use    Smoking status: Light Tobacco Smoker    Smokeless tobacco: Never Used   Substance and Sexual Activity    Alcohol use: Not Currently    Drug use: Not Currently     Frequency: 2.0 times per week     Types: Marijuana (Weed)     Comment: states has not used since Sept 2021    Sexual activity: Not on file   Other Topics Concern    Not on file   Social History Narrative    Not on file     Social Determinants of Health     Financial Resource Strain:     Difficulty of Paying Living Expenses: Not on file   Food Insecurity:     Worried About Running Out of Food in the Last Year: Not on file    Drake of Food in the Last Year: Not on file   Transportation Needs:     Lack of Transportation (Medical): Not on file    Lack of Transportation (Non-Medical):  Not on file   Physical Activity:     Days of Exercise per Week: Not on file    Minutes of Exercise per Session: Not on file   Stress:     Feeling of Stress : Not on file   Social Connections:     Frequency of Communication with Friends and Family: Not on file    Frequency of Social Gatherings with Friends and Family: Not on file    Attends Muslim Services: Not on file    Active Member of [address] [place] [address] or Organizations: Not on file    Attends Club or Organization Meetings: Not on file    Marital Status: Not on file   Intimate Partner Violence:     Fear of Current or Ex-Partner: Not on file    Emotionally Abused: Not on file    Physically Abused: Not on file    Sexually Abused: Not on file   Housing Stability:     Unable to Pay for Housing in the Last Year: Not on file    Number of [place] in the Last Year: Not on file    Unstable Housing in the Last Year: Not on file       Review of Systems:  Review of Systems   Constitutional: Negative for fever. Cardiovascular: Negative for chest pain. Musculoskeletal: Positive for back pain and neck pain. Gastrointestinal: Negative for bowel incontinence. Genitourinary: Negative for bladder incontinence. Neurological: Positive for numbness, tingling and weakness. Negative for headaches. Physical Exam:  There were no vitals taken for this visit. Physical Exam    Record/Diagnostics Review:    Last wong 10/2020  and was +THC    Assessment:  Problem List Items Addressed This Visit     Fibromyalgia - Primary    Other chronic pain    Pain of right upper extremity             Treatment Plan:  Patient relates current medications are helping the pain. Patient reports taking pain medications as prescribed, denies obtaining medications from different sources and denies use of illegal drugs. Patient denies side effects from medications like nausea, vomiting, constipation or drowsiness.  Patient reports current activities of daily living are possible due to medications and would like to continue them. As always, we encourage daily stretching and strengthening exercises, and recommend minimizing use of pain medications unless patient cannot get through daily activities due to pain. Script not needed  Follow up appointment made for 8 weeks      Pursuant to the emergency declaration under the [address] [place] [address], [facility] waiver authority and the [employer] Act, this Virtual  Visit was conducted, with patient's consent, to reduce the patient's risk of exposure to COVID-19 and provide continuity of care for an established patient. Services were provided through a telephone discussion virtually to substitute for in-person clinic visit. Patient identification was verified, and a caregiver was present when appropriate. The patient was located in a state where the provider was credentialed to provide care.

## 2022-01-18 ENCOUNTER — OFFICE VISIT (OUTPATIENT)
Dept: PAIN MANAGEMENT | Age: 57
End: 2022-01-18
Payer: COMMERCIAL

## 2022-01-18 VITALS
SYSTOLIC BLOOD PRESSURE: 120 MMHG | DIASTOLIC BLOOD PRESSURE: 74 MMHG | HEIGHT: 64 IN | WEIGHT: 146 LBS | BODY MASS INDEX: 24.92 KG/M2

## 2022-01-18 DIAGNOSIS — M79.7 FIBROMYALGIA: ICD-10-CM

## 2022-01-18 DIAGNOSIS — G89.29 OTHER CHRONIC PAIN: ICD-10-CM

## 2022-01-18 PROCEDURE — 99213 OFFICE O/P EST LOW 20 MIN: CPT | Performed by: NURSE PRACTITIONER

## 2022-01-18 RX ORDER — PREGABALIN 100 MG/1
100 CAPSULE ORAL 3 TIMES DAILY
Qty: 90 CAPSULE | Refills: 1 | Status: SHIPPED | OUTPATIENT
Start: 2022-01-18 | End: 2022-04-01 | Stop reason: SDUPTHER

## 2022-01-18 ASSESSMENT — ENCOUNTER SYMPTOMS
CONSTIPATION: 0
SHORTNESS OF BREATH: 0
BOWEL INCONTINENCE: 0
COUGH: 0
BACK PAIN: 1
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
## 2022-03-24 ENCOUNTER — TELEPHONE (OUTPATIENT)
Dept: PAIN MANAGEMENT | Age: 57
End: 2022-03-24

## 2022-03-24 NOTE — TELEPHONE ENCOUNTER
Patient is on for 04/07/22 @ 2:40am.    ----- Message from Mike Blanco sent at 3/24/2022  8:46 AM EDT -----  Regarding: The Pt called to schedule an appt with Bernadette Bhatt at the [facility]  The Pt called to reschedule her missed appt with Bernadette Gene from 3/15/2022. (Medication Refills) However, The schedule doesn't show Bernadette Bhatt has any scheduled days at the [facility] for the rest of March or April. The Pt can be reached at [phone]. She is requesting a Call Back this morning.  (If at all possible)

## 2022-04-01 ENCOUNTER — TELEPHONE (OUTPATIENT)
Dept: PAIN MANAGEMENT | Age: 57
End: 2022-04-01

## 2022-04-01 DIAGNOSIS — G89.29 OTHER CHRONIC PAIN: ICD-10-CM

## 2022-04-01 DIAGNOSIS — M79.7 FIBROMYALGIA: ICD-10-CM

## 2022-04-01 RX ORDER — PREGABALIN 100 MG/1
100 CAPSULE ORAL 3 TIMES DAILY
Qty: 90 CAPSULE | Refills: 1 | Status: SHIPPED | OUTPATIENT
Start: 2022-04-01 | End: 2022-06-01 | Stop reason: SDUPTHER

## 2022-04-07 ENCOUNTER — OFFICE VISIT (OUTPATIENT)
Dept: PAIN MANAGEMENT | Age: 57
End: 2022-04-07
Payer: COMMERCIAL

## 2022-04-07 VITALS
DIASTOLIC BLOOD PRESSURE: 68 MMHG | SYSTOLIC BLOOD PRESSURE: 128 MMHG | HEIGHT: 64 IN | BODY MASS INDEX: 25.1 KG/M2 | HEART RATE: 59 BPM | WEIGHT: 147 LBS

## 2022-04-07 DIAGNOSIS — G89.29 OTHER CHRONIC PAIN: ICD-10-CM

## 2022-04-07 DIAGNOSIS — M79.7 FIBROMYALGIA: Primary | ICD-10-CM

## 2022-04-07 DIAGNOSIS — M79.601 PAIN OF RIGHT UPPER EXTREMITY: ICD-10-CM

## 2022-04-07 PROCEDURE — 99213 OFFICE O/P EST LOW 20 MIN: CPT | Performed by: NURSE PRACTITIONER

## 2022-04-07 RX ORDER — TRAMADOL HYDROCHLORIDE 50 MG/1
TABLET ORAL
COMMUNITY
Start: 2022-04-05

## 2022-04-07 ASSESSMENT — ENCOUNTER SYMPTOMS
BOWEL INCONTINENCE: 0
ABDOMINAL PAIN: 0
CONSTIPATION: 0
BACK PAIN: 1
COUGH: 0
SHORTNESS OF BREATH: 0
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
## 2022-05-31 ENCOUNTER — TELEPHONE (OUTPATIENT)
Dept: ADMINISTRATIVE | Age: 57
End: 2022-05-31

## 2022-05-31 NOTE — TELEPHONE ENCOUNTER
Needs Lyrica refilled. Last refilled 05/01/22. Please advise.  Patient has follow up with NP on 06/09/22

## 2022-06-01 DIAGNOSIS — M79.7 FIBROMYALGIA: ICD-10-CM

## 2022-06-01 DIAGNOSIS — G89.29 OTHER CHRONIC PAIN: ICD-10-CM

## 2022-06-01 RX ORDER — PREGABALIN 100 MG/1
100 CAPSULE ORAL 3 TIMES DAILY
Qty: 90 CAPSULE | Refills: 1 | Status: SHIPPED | OUTPATIENT
Start: 2022-06-01 | End: 2022-08-01 | Stop reason: SDUPTHER

## 2022-06-09 ENCOUNTER — OFFICE VISIT (OUTPATIENT)
Dept: PAIN MANAGEMENT | Age: 57
End: 2022-06-09
Payer: COMMERCIAL

## 2022-06-09 VITALS — WEIGHT: 148 LBS | BODY MASS INDEX: 25.4 KG/M2

## 2022-06-09 DIAGNOSIS — M79.7 FIBROMYALGIA: Primary | ICD-10-CM

## 2022-06-09 DIAGNOSIS — G89.29 OTHER CHRONIC PAIN: ICD-10-CM

## 2022-06-09 PROCEDURE — 99213 OFFICE O/P EST LOW 20 MIN: CPT | Performed by: NURSE PRACTITIONER

## 2022-06-09 ASSESSMENT — ENCOUNTER SYMPTOMS
BACK PAIN: 1
COUGH: 0
ABDOMINAL PAIN: 0
CHANGE IN BOWEL HABIT: 0
NAUSEA: 0
SWOLLEN GLANDS: 0
SORE THROAT: 0
VISUAL CHANGE: 0
VOMITING: 0
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
## 2022-06-21 ENCOUNTER — TELEPHONE (OUTPATIENT)
Dept: PAIN MANAGEMENT | Age: 57
End: 2022-06-21

## 2022-07-12 ENCOUNTER — TELEPHONE (OUTPATIENT)
Dept: PAIN MANAGEMENT | Age: 57
End: 2022-07-12

## 2022-07-21 ENCOUNTER — OFFICE VISIT (OUTPATIENT)
Dept: PAIN MANAGEMENT | Age: 57
End: 2022-07-21
Payer: COMMERCIAL

## 2022-07-21 VITALS — BODY MASS INDEX: 25.1 KG/M2 | WEIGHT: 147 LBS | HEIGHT: 64 IN

## 2022-07-21 DIAGNOSIS — M47.812 CERVICAL SPONDYLOSIS WITHOUT MYELOPATHY: ICD-10-CM

## 2022-07-21 DIAGNOSIS — M54.2 CERVICALGIA: Primary | ICD-10-CM

## 2022-07-21 PROCEDURE — 99213 OFFICE O/P EST LOW 20 MIN: CPT | Performed by: NURSE PRACTITIONER

## 2022-07-21 ASSESSMENT — ENCOUNTER SYMPTOMS
SHORTNESS OF BREATH: 0
COUGH: 0
BOWEL INCONTINENCE: 0
PHOTOPHOBIA: 0
VISUAL CHANGE: 0
CONSTIPATION: 0
BACK PAIN: 1
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
## 2022-08-01 ENCOUNTER — TELEPHONE (OUTPATIENT)
Dept: PAIN MANAGEMENT | Age: 57
End: 2022-08-01

## 2022-08-01 DIAGNOSIS — M79.7 FIBROMYALGIA: ICD-10-CM

## 2022-08-01 DIAGNOSIS — G89.29 OTHER CHRONIC PAIN: ICD-10-CM

## 2022-08-01 RX ORDER — PREGABALIN 100 MG/1
100 CAPSULE ORAL 3 TIMES DAILY
Qty: 90 CAPSULE | Refills: 1 | Status: SHIPPED | OUTPATIENT
Start: 2022-08-01 | End: 2022-09-08

## 2022-09-08 ENCOUNTER — HOSPITAL ENCOUNTER (OUTPATIENT)
Dept: PAIN MANAGEMENT | Facility: CLINIC | Age: 57
Discharge: HOME | End: 2022-09-08
Payer: COMMERCIAL

## 2022-09-08 VITALS
WEIGHT: 145 LBS | TEMPERATURE: 97.1 F | OXYGEN SATURATION: 97 % | SYSTOLIC BLOOD PRESSURE: 152 MMHG | DIASTOLIC BLOOD PRESSURE: 82 MMHG | RESPIRATION RATE: 12 BRPM | BODY MASS INDEX: 24.75 KG/M2 | HEART RATE: 65 BPM | HEIGHT: 64 IN

## 2022-09-08 DIAGNOSIS — R52 PAIN MANAGEMENT: ICD-10-CM

## 2022-09-08 PROCEDURE — 64491 INJ PARAVERT F JNT C/T 2 LEV: CPT

## 2022-09-08 PROCEDURE — 6360000002 HC RX W HCPCS: Performed by: PAIN MEDICINE

## 2022-09-08 PROCEDURE — 64490 INJ PARAVERT F JNT C/T 1 LEV: CPT

## 2022-09-08 PROCEDURE — 64490 INJ PARAVERT F JNT C/T 1 LEV: CPT | Performed by: PAIN MEDICINE

## 2022-09-08 PROCEDURE — 2500000003 HC RX 250 WO HCPCS: Performed by: PAIN MEDICINE

## 2022-09-08 PROCEDURE — 64492 INJ PARAVERT F JNT C/T 3 LEV: CPT

## 2022-09-08 PROCEDURE — 64491 INJ PARAVERT F JNT C/T 2 LEV: CPT | Performed by: PAIN MEDICINE

## 2022-09-08 RX ORDER — MIDAZOLAM HYDROCHLORIDE 2 MG/2ML
INJECTION, SOLUTION INTRAMUSCULAR; INTRAVENOUS
Status: COMPLETED | OUTPATIENT
Start: 2022-09-08 | End: 2022-09-08

## 2022-09-08 RX ORDER — BUPIVACAINE HYDROCHLORIDE 2.5 MG/ML
INJECTION, SOLUTION EPIDURAL; INFILTRATION; INTRACAUDAL
Status: COMPLETED | OUTPATIENT
Start: 2022-09-08 | End: 2022-09-08

## 2022-09-08 RX ADMIN — BUPIVACAINE HYDROCHLORIDE 6 ML: 2.5 INJECTION, SOLUTION EPIDURAL; INFILTRATION; INTRACAUDAL; PERINEURAL at 09:46

## 2022-09-08 RX ADMIN — MIDAZOLAM HYDROCHLORIDE 2 MG: 1 INJECTION, SOLUTION INTRAMUSCULAR; INTRAVENOUS at 09:37

## 2022-09-08 ASSESSMENT — PAIN DESCRIPTION - DESCRIPTORS: DESCRIPTORS: ACHING;OTHER (COMMENT)

## 2022-09-08 ASSESSMENT — PAIN - FUNCTIONAL ASSESSMENT
PAIN_FUNCTIONAL_ASSESSMENT: 0-10
PAIN_FUNCTIONAL_ASSESSMENT: PREVENTS OR INTERFERES SOME ACTIVE ACTIVITIES AND ADLS
PAIN_FUNCTIONAL_ASSESSMENT: NONE - DENIES PAIN

## 2022-09-08 NOTE — OP NOTE
Cervical Facet Nerve Block:  SURGEON: Raoul Chester MD    PRE-OP DIAGNOSIS:  Cervical spondylosis without myelopathy [M47.812], Cervicalgia [M54.2]    POST-OP DIAGNOSIS: Same. PROCEDURE PERFORMED: Cervical Facet Nerve Block Multiple Levels: Bilateral C4/5, C5/6    EBL: minimal    Physician confirmed and marked the surgical site. CONSENT: Patient has undergone the educational process with this procedure, is aware and fully understands the risks involved: potential damage to any and all body organs including possible bleeding, infection, and nerve injury, allergic reaction and headache. Patient also understands that the procedure will be undertaken in a safe, controlled and monitored setting. patient recognizes that the benefits may include relief from pain and reduction in the oral use of medications. Patient agreed to proceed. The patient was counseled at length about the risks of simon Covid-19 during their perioperative period and any recovery window from their procedure. The patient was made aware that simon Covid-19  may worsen their prognosis for recovering from their procedure  and lend to a higher morbidity and/or mortality risk. All material risks, benefits, and reasonable alternatives including postponing the procedure were discussed. The patient does wish to proceed with the procedure at this time. PREP: The patient's back was prepped with chloroprep and draped appropriately. 5ml of 0.5% lidocaine was used to anesthetize the skin and subcutaneous tissue     PROCEDURE NOTE: A 25 gauge 2.5 inch spinal needle was advanced under fluoroscopic guidance to the appropriate anatomic location at the wait of the articular pillars for the appropriate medial branch nerves. Aspiration was negative for blood, CSF and producing pain. 1 ml of 0.25% Marcaine was then injected to block the facet joints at  Bilateral C4/5, C5/6.     The needle was withdrawn by the physician and the nurse applied

## 2022-09-08 NOTE — DISCHARGE INSTRUCTIONS
You have received a sedative/anesthetic therefore you should not consume any alcoholic beverages for 24 hours. Do not drive or operate machinery for 24 hours. Do not take a tub bath for 72 hours after procedure (this includes hot tubs). You may shower, but avoid hot water to injection site. Avoid strenuous activity TODAY especially if you experience dizziness. Remove band-aid the next day. Wash off any residual iodine 24 hours from today. Do not use heat, heating pad, or any other heating device over the injection site for 3 days after the procedure. If you experience pain after your procedure, you may continue with your current pain medication as prescribed. (DO NOT INCREASE YOUR PAIN MEDICATION WITHOUT TALKING TO DOCTOR)  Soreness and pain at injection site is common, may use ice to reduce soreness. Please complete pain diary as instructed.      Call Cassidy Gil at [phone] if you experience:   Fever, chills or temperature over 100    Vomiting, headache, persistent stiff neck, nausea or blurred vision   Difficulty urinating or unable to urinate within 8 hours   Increase in weakness, numbness or loss of function of limbs  Increased redness, swelling or drainage at the injection site

## 2022-09-08 NOTE — H&P
Pain Pre-Op H&P Note    Raoul Chester MD    HPI: Janette Marcano  presents with neck pain. Past Medical History:   Diagnosis Date    Anxiety     Fibromyalgia     Gastric ulcer     Lupus (HCC)     Reflux gastritis     Restless leg syndrome     Rheumatoid arthritis ([address]        Past Surgical History:   Procedure Laterality Date    COLONOSCOPY  04/04/2022    LARYNX SURGERY      OVARIAN CYST REMOVAL         Family History   Problem Relation Age of Onset    Liver Cancer Maternal Grandfather        Allergies   Allergen Reactions    Macrolides And Ketolides      Other reaction(s): Abdominal Pain    Erythromycin Nausea Only    Ketorolac Nausea Only    Nsaids      Gastric ulcers    Pcn [Penicillins] Hives    Sulfa Antibiotics Hives and Itching    Sulfamethoxazole-Trimethoprim     Ciprofloxacin Nausea And Vomiting     Pain, lethargic         Current Outpatient Medications:     TIZANIDINE HCL PO, Take by mouth 3 times daily, Disp: , Rfl:     pregabalin (LYRICA) 100 MG capsule, Take 1 capsule by mouth in the morning and 1 capsule at noon and 1 capsule before bedtime. Do all this for 30 days. , Disp: 90 capsule, Rfl: 1    traMADol (ULTRAM) 50 MG tablet, TAKE 1 TABLET BY MOUTH EVERY SIX HOURS AS NEEDED FOR PAIN (Patient not taking: Reported on 9/8/2022), Disp: , Rfl:     DULoxetine (CYMBALTA) 30 MG extended release capsule, Take 30 mg by mouth, Disp: , Rfl:     FEOSOL NATURAL RELEASE 45 MG TABS, , Disp: , Rfl:     atorvastatin (LIPITOR) 80 MG tablet, , Disp: , Rfl:     isosorbide mononitrate (IMDUR) 30 MG extended release tablet, Take 30 mg by mouth nightly (Patient not taking: Reported on 9/8/2022), Disp: , Rfl:     metoprolol succinate (TOPROL XL) 25 MG extended release tablet, Take 25 mg by mouth daily (with breakfast), Disp: , Rfl:     ondansetron (ZOFRAN-ODT) 8 MG TBDP disintegrating tablet, , Disp: , Rfl:     oxybutynin (DITROPAN-XL) 10 MG extended release tablet, Take 10 mg by mouth (Patient not taking: Reported on 4/7/2022), Disp: , Rfl:     oxyCODONE-acetaminophen (PERCOCET)  MG per tablet, Take 1 tablet by mouth every 4-6 hours as needed. (Patient not taking: Reported on 9/8/2022), Disp: , Rfl:     pantoprazole (PROTONIX) 40 MG tablet, , Disp: , Rfl:     potassium chloride (KLOR-CON M) 20 MEQ extended release tablet, Take 1 tablet by mouth daily (with breakfast), Disp: , Rfl:     QUEtiapine (SEROQUEL) 200 MG tablet, Take 400 mg by mouth nightly, Disp: , Rfl:     rOPINIRole (REQUIP) 2 MG tablet, , Disp: , Rfl:     verapamil (CALAN SR) 180 MG extended release tablet, Take 180 mg by mouth nightly, Disp: , Rfl:     SENNA-S 8.6-50 MG per tablet, , Disp: , Rfl:     fluticasone (FLONASE) 50 MCG/ACT nasal spray, 2 sprays by Nasal route daily, Disp: , Rfl:     citalopram (CELEXA) 40 MG tablet, Take 40 mg by mouth, Disp: , Rfl:     D3-1000 25 MCG (1000 UT) TABS tablet, , Disp: , Rfl:     PROVENTIL  (90 Base) MCG/ACT inhaler, , Disp: , Rfl:     Cyanocobalamin (VITAMIN B 12 PO), Take 50 mcg by mouth, Disp: , Rfl:     cyclobenzaprine (FLEXERIL) 10 MG tablet, , Disp: , Rfl:     furosemide (LASIX) 40 MG tablet, , Disp: , Rfl:     Social History     Tobacco Use    Smoking status: Light Smoker    Smokeless tobacco: Never   Substance Use Topics    Alcohol use: Not Currently       Review of Systems:   Focused review of systems was performed, and negative as pertinent to diagnosis, except as stated in HPI. Physical Exam  Constitutional:       Appearance: Normal appearance. Pulmonary:      Effort: Pulmonary effort is normal.   Neurological:      Mental Status: alert. Psychiatric:         Attention and Perception: Attention and perception normal.         Mood and Affect: Mood and affect normal.   Cardiovascular:      Rate: Normal rate.          ASA: 3          Mallampati: 2       Patient's current physical status, medications, medical history, and HPI have been reviewed and updated as appropriate on this date: 09/08/22    Risk/Benefit(s): The risks, benefits, alternatives, and potential complications have been discussed with the patient/family and informed consent has been obtained for the procedure/sedation.     Diagnosis:   spondylosis      Plan: cece De La Rosa MD

## 2022-09-09 ENCOUNTER — TELEPHONE (OUTPATIENT)
Dept: PAIN MANAGEMENT | Age: 57
End: 2022-09-09

## 2022-09-12 ENCOUNTER — TELEPHONE (OUTPATIENT)
Dept: PAIN MANAGEMENT | Age: 57
End: 2022-09-12

## 2022-09-12 NOTE — TELEPHONE ENCOUNTER
Patient called and stated that pain is so severe she is finding it hard to move her neck in any position. Had difficulty getting out of bed and states that pain increased after procedure on 09/08/22. Patient advised to go to nearest ER with pain being as severe as stated (10/10). Please advise.

## 2022-09-12 NOTE — TELEPHONE ENCOUNTER
Left vm for patient to go to the ER for her extreme pain and to call in to move her 09/22 appt up per NP.

## 2022-09-22 ENCOUNTER — OFFICE VISIT (OUTPATIENT)
Dept: PAIN MANAGEMENT | Age: 57
End: 2022-09-22
Payer: COMMERCIAL

## 2022-09-22 VITALS — HEIGHT: 64 IN | WEIGHT: 145 LBS | BODY MASS INDEX: 24.75 KG/M2

## 2022-09-22 DIAGNOSIS — M54.2 CERVICALGIA: ICD-10-CM

## 2022-09-22 DIAGNOSIS — M47.812 CERVICAL SPONDYLOSIS WITHOUT MYELOPATHY: ICD-10-CM

## 2022-09-22 DIAGNOSIS — G89.29 OTHER CHRONIC PAIN: Primary | ICD-10-CM

## 2022-09-22 PROCEDURE — 99213 OFFICE O/P EST LOW 20 MIN: CPT | Performed by: NURSE PRACTITIONER
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
## 2024-06-27 ENCOUNTER — OFFICE VISIT (OUTPATIENT)
Dept: PRIMARY CARE CLINIC | Age: 59
End: 2024-06-27
Payer: COMMERCIAL

## 2024-06-27 ENCOUNTER — HOSPITAL ENCOUNTER (OUTPATIENT)
Age: 59
Discharge: HOME | End: 2024-06-27
Payer: COMMERCIAL

## 2024-06-27 VITALS
OXYGEN SATURATION: 96 % | WEIGHT: 133 LBS | BODY MASS INDEX: 22.71 KG/M2 | DIASTOLIC BLOOD PRESSURE: 71 MMHG | HEIGHT: 64 IN | HEART RATE: 80 BPM | SYSTOLIC BLOOD PRESSURE: 115 MMHG

## 2024-06-27 DIAGNOSIS — K21.9 GASTROESOPHAGEAL REFLUX DISEASE, UNSPECIFIED WHETHER ESOPHAGITIS PRESENT: ICD-10-CM

## 2024-06-27 DIAGNOSIS — J43.8 OTHER EMPHYSEMA (HCC): ICD-10-CM

## 2024-06-27 DIAGNOSIS — Z76.89 ENCOUNTER TO ESTABLISH CARE: Primary | ICD-10-CM

## 2024-06-27 DIAGNOSIS — Z13.9 DUE FOR SCREENING: ICD-10-CM

## 2024-06-27 DIAGNOSIS — Z12.31 ENCOUNTER FOR SCREENING MAMMOGRAM FOR MALIGNANT NEOPLASM OF BREAST: ICD-10-CM

## 2024-06-27 DIAGNOSIS — I10 PRIMARY HYPERTENSION: ICD-10-CM

## 2024-06-27 PROBLEM — D50.0 IRON DEFICIENCY ANEMIA DUE TO CHRONIC BLOOD LOSS: Status: ACTIVE | Noted: 2022-02-21

## 2024-06-27 PROBLEM — S46.211A BICEPS TENDON RUPTURE, PROXIMAL, RIGHT, INITIAL ENCOUNTER: Status: ACTIVE | Noted: 2021-02-09

## 2024-06-27 PROBLEM — R07.9 CHEST PAIN: Status: ACTIVE | Noted: 2019-12-03

## 2024-06-27 PROBLEM — S52.046G: Status: ACTIVE | Noted: 2022-03-16

## 2024-06-27 PROBLEM — R11.2 NON-INTRACTABLE VOMITING WITH NAUSEA: Status: ACTIVE | Noted: 2020-01-17

## 2024-06-27 PROBLEM — Z20.822 SUSPECTED COVID-19 VIRUS INFECTION: Status: ACTIVE | Noted: 2020-05-18

## 2024-06-27 PROBLEM — D64.9 ANEMIA: Status: ACTIVE | Noted: 2019-12-03

## 2024-06-27 PROBLEM — R10.13 EPIGASTRIC PAIN: Status: ACTIVE | Noted: 2021-12-23

## 2024-06-27 PROBLEM — R68.81 EARLY SATIETY: Status: ACTIVE | Noted: 2020-01-17

## 2024-06-27 PROBLEM — R06.02 SHORTNESS OF BREATH: Status: ACTIVE | Noted: 2018-09-05

## 2024-06-27 PROBLEM — I34.1 MITRAL VALVE PROLAPSE: Status: ACTIVE | Noted: 2024-06-27

## 2024-06-27 PROBLEM — Z72.0 TOBACCO USER: Status: ACTIVE | Noted: 2017-10-04

## 2024-06-27 PROBLEM — F32.A DEPRESSION: Chronic | Status: ACTIVE | Noted: 2017-10-04

## 2024-06-27 PROBLEM — S53.441A TEAR OF ULNAR COLLATERAL LIGAMENT OF RIGHT ELBOW: Status: ACTIVE | Noted: 2022-03-16

## 2024-06-27 PROBLEM — R74.01 ELEVATION OF LEVELS OF LIVER TRANSAMINASE LEVELS: Status: ACTIVE | Noted: 2024-05-17

## 2024-06-27 PROBLEM — D50.9 IRON DEFICIENCY ANEMIA: Status: ACTIVE | Noted: 2022-02-21

## 2024-06-27 PROBLEM — R45.850 HOMICIDAL IDEATION: Status: ACTIVE | Noted: 2017-10-04

## 2024-06-27 PROBLEM — R94.39 ABNORMAL STRESS TEST: Status: ACTIVE | Noted: 2019-12-04

## 2024-06-27 PROBLEM — K92.1 HEMATOCHEZIA: Status: ACTIVE | Noted: 2020-08-20

## 2024-06-27 PROBLEM — F12.10 CANNABIS ABUSE: Chronic | Status: ACTIVE | Noted: 2017-10-04

## 2024-06-27 PROBLEM — E87.1 HYPONATREMIA: Status: ACTIVE | Noted: 2023-01-26

## 2024-06-27 PROBLEM — R94.39 ABNORMAL MYOCARDIAL PERFUSION STUDY: Status: ACTIVE | Noted: 2019-12-13

## 2024-06-27 PROBLEM — F45.0 SOMATIZATION DISORDER: Status: ACTIVE | Noted: 2019-12-03

## 2024-06-27 PROBLEM — K64.1 GRADE II INTERNAL HEMORRHOIDS: Status: ACTIVE | Noted: 2020-01-17

## 2024-06-27 PROBLEM — S63.501A WRIST SPRAIN, RIGHT, INITIAL ENCOUNTER: Status: ACTIVE | Noted: 2018-11-27

## 2024-06-27 PROBLEM — R00.2 PALPITATIONS: Status: ACTIVE | Noted: 2020-01-14

## 2024-06-27 PROBLEM — E87.6 HYPOKALEMIA: Status: ACTIVE | Noted: 2017-10-04

## 2024-06-27 PROBLEM — G56.21 NEURITIS OF RIGHT ULNAR NERVE: Status: ACTIVE | Noted: 2022-03-16

## 2024-06-27 LAB
EST. AVERAGE GLUCOSE BLD GHB EST-MCNC: 120 MG/DL
HBA1C MFR BLD: 5.8 % (ref 4–6)
HCV AB SERPL QL IA: NONREACTIVE
HIV 1+2 AB+HIV1 P24 AG SERPL QL IA: NONREACTIVE

## 2024-06-27 PROCEDURE — G8427 DOCREV CUR MEDS BY ELIG CLIN: HCPCS | Performed by: NURSE PRACTITIONER

## 2024-06-27 PROCEDURE — 3023F SPIROM DOC REV: CPT | Performed by: NURSE PRACTITIONER

## 2024-06-27 PROCEDURE — 86803 HEPATITIS C AB TEST: CPT

## 2024-06-27 PROCEDURE — 99214 OFFICE O/P EST MOD 30 MIN: CPT | Performed by: NURSE PRACTITIONER

## 2024-06-27 PROCEDURE — 3074F SYST BP LT 130 MM HG: CPT | Performed by: NURSE PRACTITIONER

## 2024-06-27 PROCEDURE — 4004F PT TOBACCO SCREEN RCVD TLK: CPT | Performed by: NURSE PRACTITIONER

## 2024-06-27 PROCEDURE — G8420 CALC BMI NORM PARAMETERS: HCPCS | Performed by: NURSE PRACTITIONER

## 2024-06-27 PROCEDURE — 3017F COLORECTAL CA SCREEN DOC REV: CPT | Performed by: NURSE PRACTITIONER

## 2024-06-27 PROCEDURE — 87389 HIV-1 AG W/HIV-1&-2 AB AG IA: CPT

## 2024-06-27 PROCEDURE — 83036 HEMOGLOBIN GLYCOSYLATED A1C: CPT

## 2024-06-27 PROCEDURE — 3078F DIAST BP <80 MM HG: CPT | Performed by: NURSE PRACTITIONER

## 2024-06-27 RX ORDER — MAGNESIUM OXIDE TAB 400 MG (241.3 MG ELEMENTAL MG) 400 (241.3 MG) MG
TAB ORAL
COMMUNITY
Start: 2024-06-21

## 2024-06-27 RX ORDER — QUETIAPINE FUMARATE 300 MG/1
TABLET, FILM COATED ORAL
COMMUNITY
Start: 2024-05-02

## 2024-06-27 RX ORDER — PRAZOSIN HYDROCHLORIDE 5 MG/1
CAPSULE ORAL
COMMUNITY
Start: 2024-04-14

## 2024-06-27 RX ORDER — SODIUM CHLORIDE 1 G/1
1000 TABLET ORAL DAILY
COMMUNITY

## 2024-06-27 RX ORDER — PREGABALIN 200 MG/1
CAPSULE ORAL
COMMUNITY
Start: 2024-06-07

## 2024-06-27 RX ORDER — SENNOSIDES 8.6 MG
TABLET ORAL
COMMUNITY
Start: 2024-06-14

## 2024-06-27 RX ORDER — ROFLUMILAST 250 UG/1
TABLET ORAL
COMMUNITY
Start: 2024-06-13

## 2024-06-27 RX ORDER — POTASSIUM CHLORIDE 1500 MG/1
TABLET, EXTENDED RELEASE ORAL
COMMUNITY
Start: 2024-06-25

## 2024-06-27 RX ORDER — ROPINIROLE 1 MG/1
TABLET, FILM COATED ORAL
COMMUNITY
Start: 2024-06-10

## 2024-06-27 RX ORDER — OXCARBAZEPINE 300 MG/1
300 TABLET, FILM COATED ORAL 2 TIMES DAILY
COMMUNITY

## 2024-06-27 RX ORDER — FLUTICASONE FUROATE, UMECLIDINIUM BROMIDE AND VILANTEROL TRIFENATATE 100; 62.5; 25 UG/1; UG/1; UG/1
1 POWDER RESPIRATORY (INHALATION) DAILY
COMMUNITY

## 2024-06-27 RX ORDER — AMLODIPINE BESYLATE 10 MG/1
10 TABLET ORAL DAILY
COMMUNITY

## 2024-06-27 RX ORDER — HYDROXYZINE HYDROCHLORIDE 25 MG/1
TABLET, FILM COATED ORAL
COMMUNITY
Start: 2024-05-29

## 2024-06-27 RX ORDER — BACLOFEN 20 MG/1
TABLET ORAL
COMMUNITY
Start: 2024-06-25

## 2024-06-27 SDOH — ECONOMIC STABILITY: FOOD INSECURITY: WITHIN THE PAST 12 MONTHS, YOU WORRIED THAT YOUR FOOD WOULD RUN OUT BEFORE YOU GOT MONEY TO BUY MORE.: NEVER TRUE

## 2024-06-27 SDOH — ECONOMIC STABILITY: FOOD INSECURITY: WITHIN THE PAST 12 MONTHS, THE FOOD YOU BOUGHT JUST DIDN'T LAST AND YOU DIDN'T HAVE MONEY TO GET MORE.: NEVER TRUE

## 2024-06-27 SDOH — ECONOMIC STABILITY: HOUSING INSECURITY
IN THE LAST 12 MONTHS, WAS THERE A TIME WHEN YOU DID NOT HAVE A STEADY PLACE TO SLEEP OR SLEPT IN A SHELTER (INCLUDING NOW)?: NO

## 2024-06-27 SDOH — ECONOMIC STABILITY: INCOME INSECURITY: HOW HARD IS IT FOR YOU TO PAY FOR THE VERY BASICS LIKE FOOD, HOUSING, MEDICAL CARE, AND HEATING?: NOT HARD AT ALL

## 2024-06-27 ASSESSMENT — PATIENT HEALTH QUESTIONNAIRE - PHQ9
9. THOUGHTS THAT YOU WOULD BE BETTER OFF DEAD, OR OF HURTING YOURSELF: NOT AT ALL
SUM OF ALL RESPONSES TO PHQ QUESTIONS 1-9: 0
1. LITTLE INTEREST OR PLEASURE IN DOING THINGS: NOT AT ALL
SUM OF ALL RESPONSES TO PHQ QUESTIONS 1-9: 0
6. FEELING BAD ABOUT YOURSELF - OR THAT YOU ARE A FAILURE OR HAVE LET YOURSELF OR YOUR FAMILY DOWN: NOT AT ALL
SUM OF ALL RESPONSES TO PHQ9 QUESTIONS 1 & 2: 0
4. FEELING TIRED OR HAVING LITTLE ENERGY: NOT AT ALL
3. TROUBLE FALLING OR STAYING ASLEEP: NOT AT ALL
8. MOVING OR SPEAKING SO SLOWLY THAT OTHER PEOPLE COULD HAVE NOTICED. OR THE OPPOSITE, BEING SO FIGETY OR RESTLESS THAT YOU HAVE BEEN MOVING AROUND A LOT MORE THAN USUAL: NOT AT ALL
2. FEELING DOWN, DEPRESSED OR HOPELESS: NOT AT ALL
10. IF YOU CHECKED OFF ANY PROBLEMS, HOW DIFFICULT HAVE THESE PROBLEMS MADE IT FOR YOU TO DO YOUR WORK, TAKE CARE OF THINGS AT HOME, OR GET ALONG WITH OTHER PEOPLE: NOT DIFFICULT AT ALL
5. POOR APPETITE OR OVEREATING: NOT AT ALL

## 2024-06-27 ASSESSMENT — ENCOUNTER SYMPTOMS
ABDOMINAL PAIN: 0
CHEST TIGHTNESS: 0
VOMITING: 0
PHOTOPHOBIA: 0
SINUS PRESSURE: 0
COLOR CHANGE: 0
SINUS PAIN: 0
NAUSEA: 0
SORE THROAT: 0
DIARRHEA: 0
SHORTNESS OF BREATH: 0
COUGH: 0
BACK PAIN: 0

## 2024-06-27 NOTE — PROGRESS NOTES
[facility]  [facility] PRIMARY [facility]  [address] [place] [id number]  Dept: [phone]       Janette Marcano is a 59 y.o. female who presents today for her  medical conditions/complaintsas noted below.  Janette Marcano is c/o of New Patient and Establish Care      HPI:     HPI    This is a 59 yr old female with underlying history of HTN, COPD, anxiety/depression, cervical cancer, bipolar disorder, SLE, IBS, chronic HA and chronic neck pain, SHA, GERD and Grade I Diastolic dysfunction who presents today to establish care.  Overall, denying any acute complaints.  Follows with multiple specialty providers for chronic disease processes.  Denying the need for any refills today.  Last saw PCP 3 weeks ago at family medicine clinic [facility].  Here today to establish with new provider    Chronic headaches/migraines and chronic neck pain with cervical spondylosis without myelopathy/cervicalgia: Follows with neurology, is in the process of establishing with new neurologist.  States she has referral information and is waiting on a call back to schedule new appointment.  No acute change in chronic headache patterns.  Does not appear to be on any chronic headache medications.  Continue to follow with specialty provider. PDMP reviewed- Lyrical from provider in [place], [place].    Hyponatremia: Following with [facility] nephrology.  Last BMP reviewed.  Sodium within normal range.  She is on fluid restriction and 1 g sodium tablets.  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  Continue with specialty follow-up.    IBS/GERD: Follows with [facility] gastroenterology and last saw Dr. Fleming.  Had EGD done 6/2023 showing  ulcers of the duodenum. Currently on PPI (protonix) and recent Bravo pH study. Also had delayed gastric emptying study. Deferring to GI for routine colorectal cancer screening.  No acute changes. Continue current

## 2024-07-01 RX ORDER — ATORVASTATIN CALCIUM 80 MG/1
80 TABLET, FILM COATED ORAL DAILY
Qty: 30 TABLET | Refills: 2 | Status: SHIPPED | OUTPATIENT
Start: 2024-07-01

## 2024-07-01 RX ORDER — PANTOPRAZOLE SODIUM 40 MG/1
40 TABLET, DELAYED RELEASE ORAL DAILY
Qty: 30 TABLET | Refills: 2 | Status: SHIPPED | OUTPATIENT
Start: 2024-07-01 | End: 2024-09-29

## 2024-07-02 DIAGNOSIS — I34.1 MITRAL VALVE PROLAPSE: Primary | ICD-10-CM

## 2024-07-23 ENCOUNTER — OFFICE VISIT (OUTPATIENT)
Dept: PRIMARY CARE CLINIC | Age: 59
End: 2024-07-23
Payer: COMMERCIAL

## 2024-07-23 VITALS
WEIGHT: 126 LBS | HEART RATE: 98 BPM | DIASTOLIC BLOOD PRESSURE: 72 MMHG | OXYGEN SATURATION: 97 % | SYSTOLIC BLOOD PRESSURE: 120 MMHG | BODY MASS INDEX: 21.51 KG/M2 | HEIGHT: 64 IN

## 2024-07-23 DIAGNOSIS — R63.4 UNINTENTIONAL WEIGHT LOSS: ICD-10-CM

## 2024-07-23 DIAGNOSIS — G43.E09 CHRONIC MIGRAINE WITH AURA WITHOUT STATUS MIGRAINOSUS, NOT INTRACTABLE: ICD-10-CM

## 2024-07-23 DIAGNOSIS — G44.029 CHRONIC CLUSTER HEADACHE, NOT INTRACTABLE: Primary | ICD-10-CM

## 2024-07-23 DIAGNOSIS — R63.0 ANOREXIA: ICD-10-CM

## 2024-07-23 DIAGNOSIS — K21.9 GASTROESOPHAGEAL REFLUX DISEASE, UNSPECIFIED WHETHER ESOPHAGITIS PRESENT: ICD-10-CM

## 2024-07-23 PROCEDURE — G8427 DOCREV CUR MEDS BY ELIG CLIN: HCPCS | Performed by: NURSE PRACTITIONER

## 2024-07-23 PROCEDURE — 3017F COLORECTAL CA SCREEN DOC REV: CPT | Performed by: NURSE PRACTITIONER

## 2024-07-23 PROCEDURE — G8420 CALC BMI NORM PARAMETERS: HCPCS | Performed by: NURSE PRACTITIONER

## 2024-07-23 PROCEDURE — 4004F PT TOBACCO SCREEN RCVD TLK: CPT | Performed by: NURSE PRACTITIONER

## 2024-07-23 PROCEDURE — 99214 OFFICE O/P EST MOD 30 MIN: CPT | Performed by: NURSE PRACTITIONER

## 2024-07-23 RX ORDER — ACETAMINOPHEN/DIPHENHYDRAMINE 500MG-25MG
237 TABLET ORAL 2 TIMES DAILY
Qty: 60 EACH | Refills: 1 | Status: SHIPPED | OUTPATIENT
Start: 2024-07-23 | End: 2024-08-22

## 2024-07-23 RX ORDER — PANTOPRAZOLE SODIUM 40 MG/1
40 TABLET, DELAYED RELEASE ORAL DAILY
Qty: 90 TABLET | Refills: 1 | Status: SHIPPED | OUTPATIENT
Start: 2024-07-23 | End: 2025-01-19

## 2024-07-23 RX ORDER — MAGNESIUM OXIDE TAB 400 MG (241.3 MG ELEMENTAL MG) 400 (241.3 MG) MG
400 TAB ORAL DAILY
Qty: 90 TABLET | Refills: 1 | Status: SHIPPED | OUTPATIENT
Start: 2024-07-23

## 2024-07-23 RX ORDER — ROFLUMILAST 500 UG/1
TABLET ORAL
COMMUNITY
Start: 2024-07-11

## 2024-07-23 ASSESSMENT — ENCOUNTER SYMPTOMS
SORE THROAT: 0
SHORTNESS OF BREATH: 0
BACK PAIN: 0
CHEST TIGHTNESS: 0
ABDOMINAL PAIN: 0
SINUS PRESSURE: 0
COUGH: 0
PHOTOPHOBIA: 0
COLOR CHANGE: 0
VOMITING: 0
SINUS PAIN: 0
NAUSEA: 0
DIARRHEA: 0

## 2024-07-23 NOTE — PROGRESS NOTES
Janette Marcano (:  1965) is a 59 y.o. female,Established patient, here for evaluation of the following chief complaint(s):  Follow-up, Anorexia (Not eating ), Fatigue, and Headache (W/ intermittent migraine  )      Assessment & Plan   1. Chronic cluster headache, not intractable  -     MAGNESIUM-OXIDE 400 (240 Mg) MG tablet; Take 1 tablet by mouth daily, Disp-90 tablet, R-1, DAWNormal  2. Gastroesophageal reflux disease, unspecified whether esophagitis present  -     pantoprazole (PROTONIX) 40 MG tablet; Take 1 tablet by mouth daily, Disp-90 tablet, R-1Normal  3. Anorexia  -     Robby Washington MD, Gastroenterology, [facility]  -     Nutritional Supplements (NUTRITIONAL SHAKE) LIQD; Take 237 mLs by mouth in the morning and at bedtime, Disp-60 each, R-1Nutritional shakes to be compliant with renal dietNormal  4. Chronic migraine with aura without status migrainosus, not intractable  -     rimegepant sulfate 75 MG TBDP; Take 1 each by mouth daily as needed (one daily as needed for migraine), Disp-30 tablet, R-0Normal  5. Unintentional weight loss  -     Nutritional Supplements (NUTRITIONAL SHAKE) LIQD; Take 237 mLs by mouth in the morning and at bedtime, Disp-60 each, R-1Nutritional shakes to be compliant with renal dietNormal      No follow-ups on file.       HPI    Has history chronic HA:  Now have daily daily HA  that last approx 2-3 hours at a time.  Occasionally lead to migraines.  States HA have been well controlled for years.  Denies ever needing medications historically.  Will not take acetaminophen or ibuprofen due to GI side effects. Denies any visual changes.  Noting 'legs are feeling weak'.  Denies falls.  No focal neuro deficits on exam. Discussed nurtex v imitrex as abortive therapy.  States her daughter gave her nurtec pills that were effective.     Sees [facility] nephrology. States her nephrologist is 'concerend she has cancer'.  But no further details.  Will obtain office

## 2024-07-29 DIAGNOSIS — G44.029 CHRONIC CLUSTER HEADACHE, NOT INTRACTABLE: Primary | ICD-10-CM

## 2024-07-29 RX ORDER — SENNOSIDES 8.6 MG
TABLET ORAL
Qty: 120 TABLET | Refills: 1 | Status: SHIPPED | OUTPATIENT
Start: 2024-07-29

## 2024-07-29 RX ORDER — SUMATRIPTAN 50 MG/1
50 TABLET, FILM COATED ORAL DAILY PRN
Qty: 9 TABLET | Refills: 3 | Status: SHIPPED | OUTPATIENT
Start: 2024-07-29

## 2024-08-08 ENCOUNTER — TELEPHONE (OUTPATIENT)
Dept: PRIMARY CARE CLINIC | Age: 59
End: 2024-08-08

## 2024-08-08 NOTE — TELEPHONE ENCOUNTER
Patient called in and wanted to know if she could get a higher dose of Sumattriptan because the 50 mg is not enough. Please advise

## 2024-08-20 RX ORDER — HYDROXYZINE HYDROCHLORIDE 25 MG/1
25 TABLET, FILM COATED ORAL NIGHTLY
Qty: 30 TABLET | Refills: 2 | OUTPATIENT
Start: 2024-08-20 | End: 2025-08-20

## 2024-08-20 RX ORDER — PRAZOSIN HYDROCHLORIDE 5 MG/1
5 CAPSULE ORAL NIGHTLY
Qty: 30 CAPSULE | Refills: 2 | OUTPATIENT
Start: 2024-08-20 | End: 2025-08-20

## 2024-08-27 ENCOUNTER — TELEPHONE (OUTPATIENT)
Dept: PRIMARY CARE CLINIC | Age: 59
End: 2024-08-27

## 2024-08-27 DIAGNOSIS — G43.E09 CHRONIC MIGRAINE WITH AURA WITHOUT STATUS MIGRAINOSUS, NOT INTRACTABLE: Primary | ICD-10-CM

## 2024-09-25 ENCOUNTER — TELEPHONE (OUTPATIENT)
Dept: PRIMARY CARE CLINIC | Age: 59
End: 2024-09-25

## 2024-09-26 ENCOUNTER — TELEPHONE (OUTPATIENT)
Dept: PRIMARY CARE CLINIC | Age: 59
End: 2024-09-26

## 2024-09-27 ENCOUNTER — TELEPHONE (OUTPATIENT)
Dept: GASTROENTEROLOGY | Age: 59
End: 2024-09-27

## 2024-09-27 NOTE — TELEPHONE ENCOUNTER
Patient missed 09/27/2024, Dr. Craven, stating she was ill, needs to r/s no openings until December, please call patient at [phone] [facility]/sc

## 2024-10-01 ENCOUNTER — OFFICE VISIT (OUTPATIENT)
Dept: PRIMARY CARE CLINIC | Age: 59
End: 2024-10-01
Payer: COMMERCIAL

## 2024-10-01 VITALS
BODY MASS INDEX: 19.33 KG/M2 | HEART RATE: 94 BPM | OXYGEN SATURATION: 96 % | WEIGHT: 113.2 LBS | DIASTOLIC BLOOD PRESSURE: 92 MMHG | HEIGHT: 64 IN | SYSTOLIC BLOOD PRESSURE: 151 MMHG

## 2024-10-01 DIAGNOSIS — J40 BRONCHITIS: Primary | ICD-10-CM

## 2024-10-01 PROCEDURE — G8484 FLU IMMUNIZE NO ADMIN: HCPCS | Performed by: NURSE PRACTITIONER

## 2024-10-01 PROCEDURE — 3017F COLORECTAL CA SCREEN DOC REV: CPT | Performed by: NURSE PRACTITIONER

## 2024-10-01 PROCEDURE — G8420 CALC BMI NORM PARAMETERS: HCPCS | Performed by: NURSE PRACTITIONER

## 2024-10-01 PROCEDURE — 99213 OFFICE O/P EST LOW 20 MIN: CPT | Performed by: NURSE PRACTITIONER

## 2024-10-01 PROCEDURE — 4004F PT TOBACCO SCREEN RCVD TLK: CPT | Performed by: NURSE PRACTITIONER

## 2024-10-01 PROCEDURE — G8427 DOCREV CUR MEDS BY ELIG CLIN: HCPCS | Performed by: NURSE PRACTITIONER

## 2024-10-01 RX ORDER — GUAIFENESIN 600 MG/1
600 TABLET, EXTENDED RELEASE ORAL 2 TIMES DAILY
Qty: 30 TABLET | Refills: 0 | Status: SHIPPED | OUTPATIENT
Start: 2024-10-01 | End: 2024-10-16

## 2024-10-01 RX ORDER — DOXYCYCLINE HYCLATE 100 MG
100 TABLET ORAL 2 TIMES DAILY
Qty: 14 TABLET | Refills: 0 | Status: SHIPPED | OUTPATIENT
Start: 2024-10-01 | End: 2024-10-08

## 2024-10-01 RX ORDER — PREDNISONE 50 MG/1
50 TABLET ORAL DAILY
Qty: 5 TABLET | Refills: 0 | Status: SHIPPED | OUTPATIENT
Start: 2024-10-01 | End: 2024-10-06

## 2024-10-01 ASSESSMENT — ENCOUNTER SYMPTOMS
SORE THROAT: 0
DIARRHEA: 0
NAUSEA: 0
VOMITING: 0
WHEEZING: 1
SINUS PAIN: 0
COUGH: 1
COLOR CHANGE: 0
ABDOMINAL PAIN: 0
BACK PAIN: 0
SINUS PRESSURE: 0
CHEST TIGHTNESS: 0
PHOTOPHOBIA: 0
SHORTNESS OF BREATH: 1

## 2024-10-01 NOTE — PROGRESS NOTES
Janette Marcano (:  1965) is a 59 y.o. female,Established patient, here for evaluation of the following chief complaint(s):  Pneumonia (/)         Assessment & Plan  Bronchitis   Acute condition, new, Supportive care with appropriate antipyretics and fluids.    Orders:    doxycycline hyclate (VIBRA-TABS) 100 MG tablet; Take 1 tablet by mouth 2 times daily for 7 days    predniSONE (DELTASONE) 50 MG tablet; Take 1 tablet by mouth daily for 5 days    guaiFENesin (MUCINEX) 600 MG extended release tablet; Take 1 tablet by mouth 2 times daily for 15 days      No follow-ups on file.       Subjective   HPI    Having 2 weeks of chest congestion, sinus congestion and subsequent right ear pain. Also having hot cold flashes and some intermittent light headedness.  Has been using otc cough syrup without relief and has been using OTC honey. She is an active smoker with underlying lung disease.    Coarse lung sounds with both faint inspiratory and expiratory wheezing.  Cough is productive with yellow mucus.  No particular shortness of breath.  Mildly diaphoretic on exam.  No hemodynamic instability.  Proceed to treat for bronchitis.  Antibiotics given patient's underlying lung disease.  Mucinex as well.    RTC as needed    Review of Systems   Constitutional:  Negative for activity change, appetite change and fever.   HENT:  Negative for sinus pressure, sinus pain and sore throat.    Eyes:  Negative for photophobia and visual disturbance.   Respiratory:  Positive for cough, shortness of breath and wheezing. Negative for chest tightness.    Cardiovascular:  Negative for chest pain.   Gastrointestinal:  Negative for abdominal pain, diarrhea, nausea and vomiting.   Endocrine: Negative for polydipsia and polyuria.   Genitourinary:  Negative for difficulty urinating.   Musculoskeletal:  Negative for back pain and neck pain.   Skin:  Negative for color change.   Neurological:  Positive for light-headedness. Negative for

## 2024-10-11 ENCOUNTER — TELEPHONE (OUTPATIENT)
Dept: PRIMARY CARE CLINIC | Age: 59
End: 2024-10-11

## 2024-10-22 ENCOUNTER — HOSPITAL ENCOUNTER (OUTPATIENT)
Age: 59
Setting detail: SPECIMEN
Discharge: HOME | End: 2024-10-22

## 2024-10-22 ENCOUNTER — OFFICE VISIT (OUTPATIENT)
Dept: PRIMARY CARE CLINIC | Age: 59
End: 2024-10-22

## 2024-10-22 VITALS
HEIGHT: 64 IN | HEART RATE: 96 BPM | WEIGHT: 108.8 LBS | BODY MASS INDEX: 18.57 KG/M2 | OXYGEN SATURATION: 96 % | SYSTOLIC BLOOD PRESSURE: 134 MMHG | DIASTOLIC BLOOD PRESSURE: 74 MMHG

## 2024-10-22 DIAGNOSIS — M79.7 FIBROMYALGIA: ICD-10-CM

## 2024-10-22 DIAGNOSIS — R91.8 PULMONARY NODULES: ICD-10-CM

## 2024-10-22 DIAGNOSIS — R63.4 UNINTENTIONAL WEIGHT LOSS: ICD-10-CM

## 2024-10-22 DIAGNOSIS — F41.0 PANIC ATTACK: ICD-10-CM

## 2024-10-22 DIAGNOSIS — Z01.419 CERVICAL SMEAR, AS PART OF ROUTINE GYNECOLOGICAL EXAMINATION: Primary | ICD-10-CM

## 2024-10-22 DIAGNOSIS — R49.8 VOCAL FATIGUE: ICD-10-CM

## 2024-10-22 DIAGNOSIS — Z12.4 SCREENING FOR MALIGNANT NEOPLASM OF CERVIX: ICD-10-CM

## 2024-10-22 DIAGNOSIS — F17.210 TOBACCO DEPENDENCE DUE TO CIGARETTES: ICD-10-CM

## 2024-10-22 DIAGNOSIS — E22.2 SIADH (SYNDROME OF INAPPROPRIATE ADH PRODUCTION) (HCC): ICD-10-CM

## 2024-10-22 RX ORDER — POLYETHYLENE GLYCOL 3350 17 G
2 POWDER IN PACKET (EA) ORAL PRN
Qty: 100 EACH | Refills: 3 | Status: SHIPPED | OUTPATIENT
Start: 2024-10-22

## 2024-10-22 RX ORDER — ALPRAZOLAM 0.25 MG/1
0.25 TABLET ORAL NIGHTLY PRN
Qty: 10 TABLET | Refills: 0 | Status: SHIPPED | OUTPATIENT
Start: 2024-10-22 | End: 2024-11-01

## 2024-10-22 ASSESSMENT — ENCOUNTER SYMPTOMS
DIARRHEA: 0
SINUS PAIN: 0
ABDOMINAL PAIN: 0
COUGH: 1
CHEST TIGHTNESS: 0
SINUS PRESSURE: 0
SORE THROAT: 0
VOMITING: 0
COLOR CHANGE: 0
NAUSEA: 0
BACK PAIN: 0
PHOTOPHOBIA: 0
VOICE CHANGE: 1
SHORTNESS OF BREATH: 0

## 2024-10-22 NOTE — PROGRESS NOTES
additional complaints and concerns as noted below.    Anxiety has significantly increased since our last visit.  Has yet to schedule with psychiatry.  Having panic attacks accompanied by dyspnea and feelings of heart racing. Waking up feeling panic stricken most days.  Unclear triggers.  Extensive mental health history.  PDMP reviewed.  Agreed to short term fill of prn nightly xanax-- MUST FOLLOW UP WITH MENTAL HEALTH.  No further refills on benzos. Patient verbalizes understanding.     Having unintentional weight loss.  Was 133lbs in June.  She smokes daily-- is done to 1/2ppd.  Has also had increase in cought-- without hemoptysis.  However, she was recently treated for bronchitis.  Has knonw pulmonary nodules.  Due for repeat chest CT in December (last done in June).  Has upcoming appointment with GI as she is overdue for CRC.  Planning on cervical cancer screening today.  She has not had a pelvix exam in years.  No vaginal complaints or pelvic complaints.  Also due for mammogram which has been ordered.  Will repeat blood work today.  Denies any change in appetitie.  Has access to food.  No fevers, chills or night sweats.    Requesting referral to rheumatologist for RA and fibromyalgia-- saw rheumatologist in [place].     Also needs referral to ENT- aslo saw ENT in [place].  Subjective vocal tone changes per patient.    SIADH with chronic hyponatremia-- requesting new referral to [facility] nephrology.  Having symptoms of nocturia.  She states this is normal.  On sodium tablets.  Repeating lab work today.         Review of Systems   Constitutional:  Positive for unexpected weight change. Negative for activity change, appetite change and fever.   HENT:  Positive for voice change. Negative for sinus pressure, sinus pain and sore throat.    Eyes:  Negative for photophobia and visual disturbance.   Respiratory:  Positive for cough. Negative for chest tightness and shortness of breath.    Cardiovascular:  Negative for

## 2024-10-24 ENCOUNTER — TELEPHONE (OUTPATIENT)
Dept: GASTROENTEROLOGY | Age: 59
End: 2024-10-24

## 2024-10-24 ENCOUNTER — TELEPHONE (OUTPATIENT)
Dept: PRIMARY CARE CLINIC | Age: 59
End: 2024-10-24

## 2024-10-24 DIAGNOSIS — K21.9 GASTROESOPHAGEAL REFLUX DISEASE, UNSPECIFIED WHETHER ESOPHAGITIS PRESENT: Primary | ICD-10-CM

## 2024-10-24 RX ORDER — ONDANSETRON 4 MG/1
4 TABLET, ORALLY DISINTEGRATING ORAL EVERY 8 HOURS PRN
Qty: 30 TABLET | Refills: 2 | Status: SHIPPED | OUTPATIENT
Start: 2024-10-24

## 2024-10-24 NOTE — TELEPHONE ENCOUNTER
LOV 10/22 Pt would like to know are you able to send her a script for nausea to her pharmacy dissolvable tablets if possible.

## 2024-10-30 LAB — CYTOLOGY REPORT: NORMAL

## 2024-11-05 ENCOUNTER — HOSPITAL ENCOUNTER (OUTPATIENT)
Dept: GENERAL RADIOLOGY | Age: 59
Discharge: HOME | End: 2024-11-07
Payer: COMMERCIAL

## 2024-11-05 ENCOUNTER — HOSPITAL ENCOUNTER (OUTPATIENT)
Age: 59
Discharge: HOME | End: 2024-11-05
Payer: COMMERCIAL

## 2024-11-05 ENCOUNTER — OFFICE VISIT (OUTPATIENT)
Dept: PRIMARY CARE CLINIC | Age: 59
End: 2024-11-05
Payer: COMMERCIAL

## 2024-11-05 ENCOUNTER — HOSPITAL ENCOUNTER (OUTPATIENT)
Age: 59
Discharge: HOME | End: 2024-11-07

## 2024-11-05 VITALS
OXYGEN SATURATION: 90 % | BODY MASS INDEX: 18.44 KG/M2 | SYSTOLIC BLOOD PRESSURE: 135 MMHG | HEIGHT: 64 IN | DIASTOLIC BLOOD PRESSURE: 82 MMHG | WEIGHT: 108 LBS | HEART RATE: 95 BPM

## 2024-11-05 DIAGNOSIS — D50.9 MICROCYTIC ANEMIA: ICD-10-CM

## 2024-11-05 DIAGNOSIS — J44.1 COPD EXACERBATION (HCC): ICD-10-CM

## 2024-11-05 DIAGNOSIS — D50.9 IRON DEFICIENCY ANEMIA, UNSPECIFIED IRON DEFICIENCY ANEMIA TYPE: Primary | ICD-10-CM

## 2024-11-05 DIAGNOSIS — R63.4 UNINTENTIONAL WEIGHT LOSS: ICD-10-CM

## 2024-11-05 DIAGNOSIS — J44.1 COPD EXACERBATION (HCC): Primary | ICD-10-CM

## 2024-11-05 DIAGNOSIS — H66.002 NON-RECURRENT ACUTE SUPPURATIVE OTITIS MEDIA OF LEFT EAR WITHOUT SPONTANEOUS RUPTURE OF TYMPANIC MEMBRANE: ICD-10-CM

## 2024-11-05 LAB
ALBUMIN SERPL-MCNC: 4 G/DL (ref 3.5–5.2)
ALBUMIN/GLOB SERPL: 1.5 {RATIO} (ref 1–2.5)
ALP SERPL-CCNC: 104 U/L (ref 35–104)
ALT SERPL-CCNC: 13 U/L (ref 10–35)
ANION GAP SERPL CALCULATED.3IONS-SCNC: 12 MMOL/L (ref 9–16)
AST SERPL-CCNC: 19 U/L (ref 10–35)
BASOPHILS # BLD: 0.03 K/UL (ref 0–0.2)
BASOPHILS NFR BLD: 1 % (ref 0–2)
BILIRUB SERPL-MCNC: 0.2 MG/DL (ref 0–1.2)
BUN SERPL-MCNC: 5 MG/DL (ref 6–20)
CALCIUM SERPL-MCNC: 9.1 MG/DL (ref 8.6–10.4)
CHLORIDE SERPL-SCNC: 104 MMOL/L (ref 98–107)
CO2 SERPL-SCNC: 23 MMOL/L (ref 20–31)
CREAT SERPL-MCNC: 0.7 MG/DL (ref 0.6–0.9)
EOSINOPHIL # BLD: 0.04 K/UL (ref 0–0.44)
EOSINOPHILS RELATIVE PERCENT: 1 % (ref 1–4)
ERYTHROCYTE [DISTWIDTH] IN BLOOD BY AUTOMATED COUNT: 18.1 % (ref 11.8–14.4)
ERYTHROCYTE [SEDIMENTATION RATE] IN BLOOD BY PHOTOMETRIC METHOD: 53 MM/HR (ref 0–30)
FERRITIN SERPL-MCNC: 12 NG/ML (ref 15–150)
GFR, ESTIMATED: >90 ML/MIN/1.73M2
GLUCOSE SERPL-MCNC: 93 MG/DL (ref 74–99)
HCT VFR BLD AUTO: 32.7 % (ref 36.3–47.1)
HGB BLD-MCNC: 9.8 G/DL (ref 11.9–15.1)
IMM GRANULOCYTES # BLD AUTO: <0.03 K/UL (ref 0–0.3)
IMM GRANULOCYTES NFR BLD: 0 %
IRON SATN MFR SERPL: 6 % (ref 20–55)
IRON SERPL-MCNC: 20 UG/DL (ref 37–145)
LYMPHOCYTES NFR BLD: 0.87 K/UL (ref 1.1–3.7)
LYMPHOCYTES RELATIVE PERCENT: 17 % (ref 24–43)
MCH RBC QN AUTO: 24.6 PG (ref 25.2–33.5)
MCHC RBC AUTO-ENTMCNC: 30 G/DL (ref 28.4–34.8)
MCV RBC AUTO: 82 FL (ref 82.6–102.9)
MONOCYTES NFR BLD: 0.57 K/UL (ref 0.1–1.2)
MONOCYTES NFR BLD: 11 % (ref 3–12)
NEUTROPHILS NFR BLD: 70 % (ref 36–65)
NEUTS SEG NFR BLD: 3.65 K/UL (ref 1.5–8.1)
NRBC BLD-RTO: 0 PER 100 WBC
PLATELET # BLD AUTO: 228 K/UL (ref 138–453)
PMV BLD AUTO: 12 FL (ref 8.1–13.5)
POTASSIUM SERPL-SCNC: 3.7 MMOL/L (ref 3.7–5.3)
PROT SERPL-MCNC: 6.6 G/DL (ref 6.6–8.7)
RBC # BLD AUTO: 3.99 M/UL (ref 3.95–5.11)
RBC # BLD: ABNORMAL 10*6/UL
SODIUM SERPL-SCNC: 139 MMOL/L (ref 136–145)
TIBC SERPL-MCNC: 337 UG/DL (ref 250–450)
TSH SERPL DL<=0.05 MIU/L-ACNC: 0.33 UIU/ML (ref 0.27–4.2)
UNSATURATED IRON BINDING CAPACITY: 317 UG/DL (ref 112–347)
WBC OTHER # BLD: 5.2 K/UL (ref 3.5–11.3)

## 2024-11-05 PROCEDURE — G8427 DOCREV CUR MEDS BY ELIG CLIN: HCPCS | Performed by: NURSE PRACTITIONER

## 2024-11-05 PROCEDURE — 85025 COMPLETE CBC W/AUTO DIFF WBC: CPT

## 2024-11-05 PROCEDURE — 3023F SPIROM DOC REV: CPT | Performed by: NURSE PRACTITIONER

## 2024-11-05 PROCEDURE — 82728 ASSAY OF FERRITIN: CPT

## 2024-11-05 PROCEDURE — 36415 COLL VENOUS BLD VENIPUNCTURE: CPT

## 2024-11-05 PROCEDURE — 83540 ASSAY OF IRON: CPT

## 2024-11-05 PROCEDURE — 84443 ASSAY THYROID STIM HORMONE: CPT

## 2024-11-05 PROCEDURE — 83550 IRON BINDING TEST: CPT

## 2024-11-05 PROCEDURE — G8420 CALC BMI NORM PARAMETERS: HCPCS | Performed by: NURSE PRACTITIONER

## 2024-11-05 PROCEDURE — 80053 COMPREHEN METABOLIC PANEL: CPT

## 2024-11-05 PROCEDURE — 3017F COLORECTAL CA SCREEN DOC REV: CPT | Performed by: NURSE PRACTITIONER

## 2024-11-05 PROCEDURE — 4004F PT TOBACCO SCREEN RCVD TLK: CPT | Performed by: NURSE PRACTITIONER

## 2024-11-05 PROCEDURE — 85652 RBC SED RATE AUTOMATED: CPT

## 2024-11-05 PROCEDURE — 71046 X-RAY EXAM CHEST 2 VIEWS: CPT

## 2024-11-05 PROCEDURE — G8484 FLU IMMUNIZE NO ADMIN: HCPCS | Performed by: NURSE PRACTITIONER

## 2024-11-05 RX ORDER — FERROUS SULFATE 325(65) MG
325 TABLET ORAL
Qty: 180 TABLET | Refills: 1 | Status: SHIPPED | OUTPATIENT
Start: 2024-11-05

## 2024-11-05 RX ORDER — PREDNISONE 10 MG/1
TABLET ORAL
COMMUNITY
Start: 2024-09-20

## 2024-11-05 RX ORDER — BUDESONIDE AND FORMOTEROL FUMARATE DIHYDRATE 160; 4.5 UG/1; UG/1
AEROSOL RESPIRATORY (INHALATION)
COMMUNITY
Start: 2024-10-23

## 2024-11-05 RX ORDER — ONDANSETRON 4 MG/1
TABLET, FILM COATED ORAL
COMMUNITY
Start: 2024-10-24

## 2024-11-05 RX ORDER — MELOXICAM 15 MG/1
TABLET ORAL
COMMUNITY
Start: 2024-09-30

## 2024-11-05 RX ORDER — PREDNISONE 20 MG/1
40 TABLET ORAL DAILY
Qty: 10 TABLET | Refills: 0 | Status: SHIPPED | OUTPATIENT
Start: 2024-11-05 | End: 2024-11-10

## 2024-11-05 ASSESSMENT — ENCOUNTER SYMPTOMS
BACK PAIN: 0
CHEST TIGHTNESS: 0
SORE THROAT: 0
DIARRHEA: 0
SHORTNESS OF BREATH: 0
PHOTOPHOBIA: 0
COUGH: 1
VOMITING: 0
SINUS PAIN: 0
ABDOMINAL PAIN: 0
SINUS PRESSURE: 0
COLOR CHANGE: 0
NAUSEA: 0

## 2024-11-05 ASSESSMENT — COPD QUESTIONNAIRES: COPD: 1

## 2024-11-05 NOTE — PROGRESS NOTES
pain and sore throat.    Eyes:  Negative for photophobia and visual disturbance.   Respiratory:  Positive for cough. Negative for chest tightness and shortness of breath.    Cardiovascular:  Negative for chest pain.   Gastrointestinal:  Negative for abdominal pain, diarrhea, nausea and vomiting.   Endocrine: Negative for polydipsia and polyuria.   Genitourinary:  Negative for difficulty urinating.   Musculoskeletal:  Negative for back pain and neck pain.   Skin:  Negative for color change.   Neurological:  Negative for dizziness, light-headedness and headaches.   Psychiatric/Behavioral:  Negative for behavioral problems.           Objective   Physical Exam  Vitals and nursing note reviewed.   Constitutional:       General: She is not in acute distress.     Appearance: Normal appearance.   HENT:      Head: Normocephalic.      Right Ear: External ear normal.      Left Ear: External ear normal.      Nose: Nose normal. No congestion or rhinorrhea.      Mouth/Throat:      Mouth: Mucous membranes are moist.      Pharynx: Oropharynx is clear.   Eyes:      Extraocular Movements: Extraocular movements intact.      Conjunctiva/sclera: Conjunctivae normal.      Pupils: Pupils are equal, round, and reactive to light.   Cardiovascular:      Rate and Rhythm: Normal rate and regular rhythm.      Pulses: Normal pulses.      Heart sounds: Normal heart sounds. No murmur heard.  Pulmonary:      Effort: Pulmonary effort is normal. No respiratory distress.      Breath sounds: Wheezing present.   Abdominal:      Palpations: Abdomen is soft.      Tenderness: There is no abdominal tenderness.   Musculoskeletal:         General: No swelling or signs of injury. Normal range of motion.      Cervical back: Normal range of motion.   Skin:     General: Skin is warm and dry.      Capillary Refill: Capillary refill takes less than 2 seconds.   Neurological:      General: No focal deficit present.      Mental Status: She is alert and oriented to

## 2024-11-06 RX ORDER — DOXYCYCLINE HYCLATE 100 MG
100 TABLET ORAL 2 TIMES DAILY
Qty: 14 TABLET | Refills: 0 | Status: SHIPPED | OUTPATIENT
Start: 2024-11-06 | End: 2024-11-13

## 2024-11-08 ENCOUNTER — TELEPHONE (OUTPATIENT)
Dept: PRIMARY CARE CLINIC | Age: 59
End: 2024-11-08

## 2024-11-08 DIAGNOSIS — G43.E09 CHRONIC MIGRAINE WITH AURA WITHOUT STATUS MIGRAINOSUS, NOT INTRACTABLE: ICD-10-CM

## 2024-11-08 DIAGNOSIS — F41.9 ANXIETY: Primary | Chronic | ICD-10-CM

## 2024-11-08 NOTE — TELEPHONE ENCOUNTER
Pt would like to know can she have a referral to a psychiatrist fax to [phone] [facility] she would like it sent to if possible

## 2024-11-08 NOTE — TELEPHONE ENCOUNTER
We do not have psych here in the hospital.  Referral placed to private practice psychiatry.  However, she is likely to get in sooner by calling Luis Manuel, Terry haywood etc

## 2024-11-11 ENCOUNTER — HOSPITAL ENCOUNTER (OUTPATIENT)
Dept: CT IMAGING | Age: 59
Discharge: HOME | End: 2024-11-13
Payer: COMMERCIAL

## 2024-11-11 DIAGNOSIS — R91.8 PULMONARY NODULES: ICD-10-CM

## 2024-11-11 PROCEDURE — 71250 CT THORAX DX C-: CPT

## 2024-11-14 DIAGNOSIS — K21.9 GASTROESOPHAGEAL REFLUX DISEASE, UNSPECIFIED WHETHER ESOPHAGITIS PRESENT: ICD-10-CM

## 2024-11-14 DIAGNOSIS — G44.029 CHRONIC CLUSTER HEADACHE, NOT INTRACTABLE: ICD-10-CM

## 2024-11-14 RX ORDER — HYDROXYZINE HYDROCHLORIDE 25 MG/1
25 TABLET, FILM COATED ORAL EVERY 8 HOURS PRN
Qty: 90 TABLET | Refills: 1 | Status: SHIPPED | OUTPATIENT
Start: 2024-11-14 | End: 2025-01-13

## 2024-11-14 RX ORDER — MAGNESIUM OXIDE TAB 400 MG (241.3 MG ELEMENTAL MG) 400 (241.3 MG) MG
400 TAB ORAL DAILY
Qty: 90 TABLET | Refills: 1 | Status: SHIPPED | OUTPATIENT
Start: 2024-11-14

## 2024-11-14 RX ORDER — PRAZOSIN HYDROCHLORIDE 5 MG/1
CAPSULE ORAL
Qty: 30 CAPSULE | OUTPATIENT
Start: 2024-11-14

## 2024-11-14 RX ORDER — PANTOPRAZOLE SODIUM 40 MG/1
40 TABLET, DELAYED RELEASE ORAL DAILY
Qty: 90 TABLET | Refills: 1 | Status: SHIPPED | OUTPATIENT
Start: 2024-11-14 | End: 2025-05-13

## 2024-11-15 ENCOUNTER — TELEPHONE (OUTPATIENT)
Dept: PRIMARY CARE CLINIC | Age: 59
End: 2024-11-15

## 2024-11-15 DIAGNOSIS — F43.10 PTSD (POST-TRAUMATIC STRESS DISORDER): Primary | ICD-10-CM

## 2024-11-15 RX ORDER — PRAZOSIN HYDROCHLORIDE 5 MG/1
5 CAPSULE ORAL NIGHTLY
Qty: 30 CAPSULE | Refills: 0 | Status: SHIPPED | OUTPATIENT
Start: 2024-11-15

## 2024-11-15 NOTE — TELEPHONE ENCOUNTER
Pt stated she's is making an appt with physictry are you willign to prescribe a 30 day supply until she gets in.

## 2024-11-25 ENCOUNTER — PREP FOR PROCEDURE (OUTPATIENT)
Dept: GASTROENTEROLOGY | Age: 59
End: 2024-11-25

## 2024-11-25 ENCOUNTER — OFFICE VISIT (OUTPATIENT)
Dept: GASTROENTEROLOGY | Age: 59
End: 2024-11-25
Payer: COMMERCIAL

## 2024-11-25 VITALS — WEIGHT: 102.8 LBS | BODY MASS INDEX: 17.55 KG/M2 | TEMPERATURE: 97.3 F | RESPIRATION RATE: 16 BRPM | HEIGHT: 64 IN

## 2024-11-25 DIAGNOSIS — R11.2 NAUSEA AND VOMITING, UNSPECIFIED VOMITING TYPE: ICD-10-CM

## 2024-11-25 DIAGNOSIS — K58.2 IRRITABLE BOWEL SYNDROME WITH BOTH CONSTIPATION AND DIARRHEA: Primary | ICD-10-CM

## 2024-11-25 DIAGNOSIS — K92.1 HEMATOCHEZIA: ICD-10-CM

## 2024-11-25 DIAGNOSIS — R63.0 ANOREXIA: ICD-10-CM

## 2024-11-25 PROCEDURE — G8484 FLU IMMUNIZE NO ADMIN: HCPCS | Performed by: STUDENT IN AN ORGANIZED HEALTH CARE EDUCATION/TRAINING PROGRAM

## 2024-11-25 PROCEDURE — G8427 DOCREV CUR MEDS BY ELIG CLIN: HCPCS | Performed by: STUDENT IN AN ORGANIZED HEALTH CARE EDUCATION/TRAINING PROGRAM

## 2024-11-25 PROCEDURE — 99204 OFFICE O/P NEW MOD 45 MIN: CPT | Performed by: STUDENT IN AN ORGANIZED HEALTH CARE EDUCATION/TRAINING PROGRAM

## 2024-11-25 PROCEDURE — G8419 CALC BMI OUT NRM PARAM NOF/U: HCPCS | Performed by: STUDENT IN AN ORGANIZED HEALTH CARE EDUCATION/TRAINING PROGRAM

## 2024-11-25 PROCEDURE — 3017F COLORECTAL CA SCREEN DOC REV: CPT | Performed by: STUDENT IN AN ORGANIZED HEALTH CARE EDUCATION/TRAINING PROGRAM

## 2024-11-25 PROCEDURE — 4004F PT TOBACCO SCREEN RCVD TLK: CPT | Performed by: STUDENT IN AN ORGANIZED HEALTH CARE EDUCATION/TRAINING PROGRAM

## 2024-11-25 RX ORDER — MIRTAZAPINE 7.5 MG/1
7.5 TABLET, FILM COATED ORAL NIGHTLY
Qty: 30 TABLET | Refills: 2 | Status: SHIPPED | OUTPATIENT
Start: 2024-11-25 | End: 2024-11-25

## 2024-11-25 RX ORDER — MIRTAZAPINE 7.5 MG/1
7.5 TABLET, FILM COATED ORAL NIGHTLY
Qty: 30 TABLET | Refills: 2 | Status: SHIPPED | OUTPATIENT
Start: 2024-11-25

## 2024-11-25 RX ORDER — DICYCLOMINE HYDROCHLORIDE 10 MG/1
10 CAPSULE ORAL 3 TIMES DAILY PRN
Qty: 60 CAPSULE | Refills: 0 | Status: SHIPPED | OUTPATIENT
Start: 2024-11-25

## 2024-11-25 RX ORDER — POLYETHYLENE GLYCOL 3350, SODIUM SULFATE ANHYDROUS, SODIUM BICARBONATE, SODIUM CHLORIDE, POTASSIUM CHLORIDE 236; 22.74; 6.74; 5.86; 2.97 G/4L; G/4L; G/4L; G/4L; G/4L
4 POWDER, FOR SOLUTION ORAL ONCE
Qty: 4000 ML | Refills: 0 | Status: SHIPPED | OUTPATIENT
Start: 2024-11-25 | End: 2024-11-25

## 2024-11-25 RX ORDER — SODIUM, POTASSIUM,MAG SULFATES 17.5-3.13G
SOLUTION, RECONSTITUTED, ORAL ORAL
Qty: 1 EACH | Refills: 0 | Status: CANCELLED | OUTPATIENT
Start: 2024-11-25

## 2024-11-25 NOTE — TELEPHONE ENCOUNTER
Procedure scheduled/Dr Craven  Procedure:COLON/EGD  Dx: HEMATACHEZIA/NAUSEA  Date:03/11/2025  Time:1100 AM  Hospital:[facility] phone call:TBD  Bowel Prep instructions given:SUPREP  In office/via phone: IN OFFICE  Clearance needed:NONE

## 2024-11-25 NOTE — PROGRESS NOTES
Reason for Referral:     Shiv Nika, APRN - CNP  [address] [place] [address]    Chief Complaint   Patient presents with    New Patient     S/s of anoxia     Gastroesophageal Reflux     Currently taking protonix for gerd    Nausea     Mostly in the morning        1. Irritable bowel syndrome with both constipation and diarrhea    2. Hematochezia    3. Nausea and vomiting, unspecified vomiting type    4. Anorexia        HISTORY OF PRESENT ILLNESS: Ms.Amy MARIBEL Marcano is a 59 y.o. female with a past history remarkable for IBS-D, GERD, anorexia, referred as new consultation for evaluation of years, BRBPR.    She has lost weight.  She has to force herself to eat.  She denies any postprandial pain or nausea.  However, she sometimes has nausea in the morning. Sometimes throws up in the afternoon 1 hour after she eats, never old food.  She denies early satiety problem or bloating.  She does have reflux for which she takes Protonix 40 mg in the morning .  Sometimes she has phlegm because of her reflux and problems swallowing pills. She previously had breakthrough reflux on ppi and consideration of hernia surgery however currently she feels she is happy with pantoprazole 40 daily  She was previously diagnosed with IBS-D and used to be on Bentyl however she ran out of it and IBS-D is back   There is bleeding from the lower GI tract and blood clots come out especially in the last 3 days when she feels her hemorrhoids are \"bursting\". Sometimes she has to use a pad due to severity of bleeding and there is blood on the pad. There is weight loss.  She does have severe anemia and she used to be on IV iron with [facility].    Previous Endoscopies  6/2023: EGD + Bravo ON PPI's, 06/13/2023:  Scattered mild gastritis w erosions in the antrum, s/p bx's.  Stomach otherwise unremarkable.  Esophagus unremarkable.  Duodenum unremarkable s/p bx's.  PH probe placed w/o incident.  Path:  Gastric and duodenal bx's

## 2024-12-02 ENCOUNTER — TELEPHONE (OUTPATIENT)
Dept: PRIMARY CARE CLINIC | Age: 59
End: 2024-12-02

## 2024-12-02 NOTE — TELEPHONE ENCOUNTER
Pt would like a refill on AlpraZolam, pt also stated that her GI told her to get a referral to Hemotology

## 2025-01-02 ENCOUNTER — PATIENT MESSAGE (OUTPATIENT)
Dept: GASTROENTEROLOGY | Age: 60
End: 2025-01-02

## 2025-01-02 NOTE — TELEPHONE ENCOUNTER
Writer sent patient message via my chart regarding patients procedure being cancelled due to provider conflict.

## 2025-01-13 ENCOUNTER — TELEPHONE (OUTPATIENT)
Dept: GASTROENTEROLOGY | Age: 60
End: 2025-01-13

## 2025-01-13 NOTE — TELEPHONE ENCOUNTER
Please see if  has a sooner appt for procedure d/t pt recent hospital visit and current symptoms.

## 2025-01-13 NOTE — TELEPHONE ENCOUNTER
Patient left message informing she was recently admitted to the hospital for chest pains. While there her hemoglobin and hematocrit started dropping. Patient signed a form while there for possible transfusion if level dropped one more number.  Patient was scheduled on 3/11/25 which has been cancelled due to provider conflict. Patient is asking for a call back as she is concerned her ulcers are bleeding. Patient contact [phone].

## 2025-01-14 ENCOUNTER — PREP FOR PROCEDURE (OUTPATIENT)
Dept: GASTROENTEROLOGY | Age: 60
End: 2025-01-14

## 2025-01-14 ENCOUNTER — PATIENT MESSAGE (OUTPATIENT)
Dept: GASTROENTEROLOGY | Age: 60
End: 2025-01-14

## 2025-01-14 DIAGNOSIS — R11.0 NAUSEA: ICD-10-CM

## 2025-01-14 PROBLEM — K58.9 IBS (IRRITABLE BOWEL SYNDROME): Status: ACTIVE | Noted: 2025-01-14

## 2025-01-14 PROBLEM — R11.10 VOMITING: Status: ACTIVE | Noted: 2025-01-14

## 2025-01-14 NOTE — TELEPHONE ENCOUNTER
Patient left message informing she was recently admitted to the hospital for chest pains. While there her hemoglobin and hematocrit started dropping. Patient signed a form while there for possible transfusion if level dropped one more number.  Patient was scheduled on 3/11/25 which has been cancelled due to provider conflict.       Would you like a sooner procedure date for patient due to above. Writer has jimmy. Jan 20th for an afternoon procedure, please advise.

## 2025-01-16 ENCOUNTER — HOSPITAL ENCOUNTER (OUTPATIENT)
Dept: PREADMISSION TESTING | Age: 60
Discharge: HOME | End: 2025-01-20

## 2025-01-16 ENCOUNTER — PATIENT MESSAGE (OUTPATIENT)
Dept: GASTROENTEROLOGY | Age: 60
End: 2025-01-16

## 2025-01-16 ENCOUNTER — PREP FOR PROCEDURE (OUTPATIENT)
Dept: GASTROENTEROLOGY | Age: 60
End: 2025-01-16

## 2025-01-16 VITALS — HEIGHT: 64 IN | WEIGHT: 98 LBS | BODY MASS INDEX: 16.73 KG/M2

## 2025-01-16 DIAGNOSIS — R63.0 ANOREXIA: ICD-10-CM

## 2025-01-16 RX ORDER — ALPRAZOLAM 0.25 MG/1
0.25 TABLET ORAL NIGHTLY PRN
COMMUNITY

## 2025-01-16 RX ORDER — POLYETHYLENE GLYCOL-3350 AND ELECTROLYTES 236; 6.74; 5.86; 2.97; 22.74 G/274.31G; G/274.31G; G/274.31G; G/274.31G; G/274.31G
POWDER, FOR SOLUTION ORAL
COMMUNITY
Start: 2024-11-25

## 2025-01-16 RX ORDER — HYDROXYZINE HYDROCHLORIDE 10 MG/1
10 TABLET, FILM COATED ORAL 3 TIMES DAILY PRN
COMMUNITY

## 2025-01-16 RX ORDER — CLONIDINE HYDROCHLORIDE 0.1 MG/1
0.1 TABLET ORAL 2 TIMES DAILY
COMMUNITY

## 2025-01-16 RX ORDER — HYDRALAZINE HYDROCHLORIDE 25 MG/1
25 TABLET, FILM COATED ORAL 3 TIMES DAILY
COMMUNITY
End: 2025-01-16

## 2025-01-16 NOTE — PROGRESS NOTES
Pre-op Instructions For Out-Patient Endoscopy Surgery    Medication Instructions:  Please stop herbs and any supplements now (includes vitamins and minerals).    For these medications:  Dulaglutide (Trulicity), Exenatide (Byetta and Bydureon, Liraglutide (Victoza), Lixisenatide (Adlyxin), Semaglutide (Ozempic and Rybelsus), Tirzepatide (Mounjaro, Zepbound)- Stop 1 week prior if taking weekly or 1 day prior if taking every 12 hours or daily.  N/A    Please contact your surgeon and prescribing physician for pre-op instructions for any blood thinners.     If you have inhalers/aerosol treatments at home, please use them the morning of your surgery and bring the inhalers with you to the hospital.    Please take the following medications the morning of your surgery with a sip of water:    Janette will be taking her Xanax, Hydralazine, Clonidine, NaCl, and Lyrica the morning of her procedure.     Surgery Instructions:  After midnight before surgery:  Do not eat or drink anything, including water, mints, gum, and hard candy.  You may brush your teeth without swallowing.  No smoking, chewing tobacco, or street drugs.    ** Please Follow Bowel Prep instructions if given by surgeon's office**    Please shower or bathe before surgery.       Please do not wear any cologne, lotion, powder, jewelry, piercings, perfume, makeup, nail polish, hair accessories, or hair spray on the day of surgery.  Wear loose comfortable clothing.    Leave your valuables at home but bring a payment source for any after-surgery prescriptions you plan to fill at [facility].  Bring a storage case for any glasses/contacts.    An adult who is responsible for you MUST drive you home and should be with you for the first 24 hours after surgery.     The Day of Surgery:  Arrive at [facility] Surgery Entrance at the time directed by your surgeon and check in at the desk. 1:15pm    If you have a living will or healthcare power of ,

## 2025-01-22 ENCOUNTER — TELEPHONE (OUTPATIENT)
Dept: GASTROENTEROLOGY | Age: 60
End: 2025-01-22

## 2025-01-22 DIAGNOSIS — K21.9 GASTROESOPHAGEAL REFLUX DISEASE, UNSPECIFIED WHETHER ESOPHAGITIS PRESENT: ICD-10-CM

## 2025-01-22 RX ORDER — PANTOPRAZOLE SODIUM 40 MG/1
40 TABLET, DELAYED RELEASE ORAL DAILY
Qty: 90 TABLET | Refills: 1 | Status: SHIPPED | OUTPATIENT
Start: 2025-01-22 | End: 2025-07-21

## 2025-01-22 NOTE — TELEPHONE ENCOUNTER
Patient called again and is looking for antacid to help with pressure. Patient stated that she needs it sent to pharmacy because her insurance will pay for it and she has no money to pay for it.

## 2025-01-22 NOTE — TELEPHONE ENCOUNTER
Dr Wilburn received message in [facility] at 8am from patient stating that sheis having problems swallowing. Felt like her coffee was not going down well this morning. Usually swallowing goes slowly but today she felt like the pressure would not go away. Pressure resoles if she belches. Associated with CP. States everything feels \"jagged\" even when she chews thoroughly. She looked online and thinks she has GERD and is requesting a medication to be sent to [facility], and states she is not taking anything for GERD at this time. Requesting immediate assistance.     Writer returned patient call but went to voicemail. Requesting call back for further information vs presentation to ED if the pain continues to worsen, if she is choking, or having any red flag symptoms.     Per chart review, she should be taking protonix 40mg QD at this time. She does have EGD/colonoscopy scheduled for March with Dr Craven.     Electronically signed by Zoe Simmons PA-C on 1/22/2025 at 9:15AM

## 2025-01-22 NOTE — TELEPHONE ENCOUNTER
Writer called pt back. Pt did not answer so writer left a vm. Pt then called back in. Writer advised of Zoe NARANJO, previous notes. Pt advised she has been taking her medication for GERD and does not know why the on call advised she said she has not. Writer advised there is a note put in to  and is awaiting a response at this time.

## 2025-01-22 NOTE — TELEPHONE ENCOUNTER
Writer spoke to  who advised for pt to take Protonix 40mg x2 a day for a few weeks. MD wants pt to call and let office know how she does increasing GERD medication from once to twice a week. Writer called pt and went over instructions. Pt advised understanding and that she would call office back in 2 weeks. Pt thanked, no further concerns at this time.

## 2025-01-27 ENCOUNTER — HOSPITAL ENCOUNTER (OUTPATIENT)
Dept: MAMMOGRAPHY | Age: 60
Discharge: HOME | End: 2025-01-29
Payer: COMMERCIAL

## 2025-01-27 VITALS — WEIGHT: 98 LBS | BODY MASS INDEX: 16.73 KG/M2 | HEIGHT: 64 IN

## 2025-01-27 DIAGNOSIS — Z12.31 ENCOUNTER FOR SCREENING MAMMOGRAM FOR MALIGNANT NEOPLASM OF BREAST: ICD-10-CM

## 2025-01-27 PROCEDURE — 77063 BREAST TOMOSYNTHESIS BI: CPT

## 2025-01-30 DIAGNOSIS — R63.0 ANOREXIA: ICD-10-CM

## 2025-01-30 RX ORDER — MIRTAZAPINE 7.5 MG/1
7.5 TABLET, FILM COATED ORAL NIGHTLY
Qty: 30 TABLET | Refills: 2 | Status: SHIPPED | OUTPATIENT
Start: 2025-01-30

## 2025-02-03 ENCOUNTER — HOSPITAL ENCOUNTER (OUTPATIENT)
Dept: MAMMOGRAPHY | Age: 60
Discharge: HOME | End: 2025-02-05
Payer: COMMERCIAL

## 2025-02-03 ENCOUNTER — HOSPITAL ENCOUNTER (OUTPATIENT)
Dept: ULTRASOUND IMAGING | Age: 60
Discharge: HOME | End: 2025-02-05
Payer: COMMERCIAL

## 2025-02-03 DIAGNOSIS — R92.8 ABNORMAL MAMMOGRAM: ICD-10-CM

## 2025-02-03 PROCEDURE — G0279 TOMOSYNTHESIS, MAMMO: HCPCS

## 2025-02-03 PROCEDURE — 76642 ULTRASOUND BREAST LIMITED: CPT

## 2025-03-10 ENCOUNTER — HOSPITAL ENCOUNTER (OUTPATIENT)
Dept: PREADMISSION TESTING | Age: 60
Discharge: HOME | End: 2025-03-14

## 2025-03-10 VITALS — WEIGHT: 88 LBS | HEIGHT: 64 IN | BODY MASS INDEX: 15.03 KG/M2

## 2025-03-10 RX ORDER — AMLODIPINE BESYLATE 5 MG/1
5 TABLET ORAL DAILY
COMMUNITY

## 2025-03-10 RX ORDER — MULTIVIT WITH MINERALS/LUTEIN
400 TABLET ORAL 2 TIMES DAILY
COMMUNITY

## 2025-03-10 NOTE — PROGRESS NOTES
Pre-op Instructions For Out-Patient Endoscopy Surgery    Medication Instructions:  Please stop herbs and any supplements now (includes vitamins and minerals).    For these medications:  Dulaglutide (Trulicity), Exenatide (Byetta and Bydureon, Liraglutide (Victoza), Lixisenatide (Adlyxin), Semaglutide (Ozempic and Rybelsus), Tirzepatide (Mounjaro, Zepbound)- Stop 1 week prior if taking weekly or 1 day prior if taking every 12 hours or daily.     Please contact your surgeon and prescribing physician for pre-op instructions for any blood thinners.    If you have inhalers/aerosol treatments at home, please use them the morning of your surgery and bring the inhalers with you to the hospital.    Please take the following medications the morning of your surgery with a sip of water:    lyrica,xanax,clonidine,amlodipine and hydroxyzine    Surgery Instructions:  After midnight before surgery:  Do not eat or drink anything, including water, mints, gum, and hard candy.  You may brush your teeth without swallowing.  No smoking, chewing tobacco, or street drugs.     ** Please Follow Bowel Prep instructions if given by surgeon's office**    Please shower or bathe before surgery.       Please do not wear any cologne, lotion, powder, jewelry, piercings, perfume, makeup, nail polish, hair accessories, or hair spray on the day of surgery.  Wear loose comfortable clothing.    Leave your valuables at home but bring a payment source for any after-surgery prescriptions you plan to fill at [facility].  Bring a storage case for any glasses/contacts.    An adult who is responsible for you MUST drive you home and should be with you for the first 24 hours after surgery.     The Day of Surgery:  Arrive at [facility] Surgery Entrance at the time directed by your surgeon and check in at the desk.     If you have a living will or healthcare power of , please bring a copy.    You will be taken to the pre-op holding